# Patient Record
Sex: FEMALE | Race: WHITE | NOT HISPANIC OR LATINO | URBAN - METROPOLITAN AREA
[De-identification: names, ages, dates, MRNs, and addresses within clinical notes are randomized per-mention and may not be internally consistent; named-entity substitution may affect disease eponyms.]

---

## 2018-12-20 ENCOUNTER — INPATIENT (INPATIENT)
Facility: HOSPITAL | Age: 83
LOS: 2 days | Discharge: NURSING, REHAB OR LONG-TERM CARE | End: 2018-12-23
Attending: INTERNAL MEDICINE | Admitting: INTERNAL MEDICINE
Payer: MEDICARE

## 2018-12-20 VITALS
OXYGEN SATURATION: 96 % | HEART RATE: 93 BPM | DIASTOLIC BLOOD PRESSURE: 78 MMHG | HEIGHT: 61 IN | RESPIRATION RATE: 20 BRPM | WEIGHT: 125 LBS | SYSTOLIC BLOOD PRESSURE: 158 MMHG

## 2018-12-20 DIAGNOSIS — S32.599A OTHER SPECIFIED FRACTURE OF UNSPECIFIED PUBIS, INITIAL ENCOUNTER FOR CLOSED FRACTURE: ICD-10-CM

## 2018-12-20 DIAGNOSIS — Z90.12 ACQUIRED ABSENCE OF LEFT BREAST AND NIPPLE: Chronic | ICD-10-CM

## 2018-12-20 DIAGNOSIS — I10 ESSENTIAL (PRIMARY) HYPERTENSION: ICD-10-CM

## 2018-12-20 DIAGNOSIS — C50.412 MALIGNANT NEOPLASM OF UPPER-OUTER QUADRANT OF LEFT FEMALE BREAST: ICD-10-CM

## 2018-12-20 DIAGNOSIS — Z90.49 ACQUIRED ABSENCE OF OTHER SPECIFIED PARTS OF DIGESTIVE TRACT: Chronic | ICD-10-CM

## 2018-12-20 LAB
ALBUMIN SERPL ELPH-MCNC: 3.2 G/DL — LOW (ref 3.3–5)
ALP SERPL-CCNC: 104 U/L — SIGNIFICANT CHANGE UP (ref 40–120)
ALT FLD-CCNC: 8 U/L — LOW (ref 12–78)
ANION GAP SERPL CALC-SCNC: 8 MMOL/L — SIGNIFICANT CHANGE UP (ref 5–17)
AST SERPL-CCNC: 15 U/L — SIGNIFICANT CHANGE UP (ref 15–37)
BASOPHILS # BLD AUTO: 0.02 K/UL — SIGNIFICANT CHANGE UP (ref 0–0.2)
BASOPHILS NFR BLD AUTO: 0.4 % — SIGNIFICANT CHANGE UP (ref 0–2)
BILIRUB SERPL-MCNC: 0.9 MG/DL — SIGNIFICANT CHANGE UP (ref 0.2–1.2)
BUN SERPL-MCNC: 46 MG/DL — HIGH (ref 7–23)
CALCIUM SERPL-MCNC: 8.4 MG/DL — LOW (ref 8.5–10.1)
CHLORIDE SERPL-SCNC: 111 MMOL/L — HIGH (ref 96–108)
CO2 SERPL-SCNC: 26 MMOL/L — SIGNIFICANT CHANGE UP (ref 22–31)
CREAT SERPL-MCNC: 1.59 MG/DL — HIGH (ref 0.5–1.3)
EOSINOPHIL # BLD AUTO: 0.18 K/UL — SIGNIFICANT CHANGE UP (ref 0–0.5)
EOSINOPHIL NFR BLD AUTO: 3.8 % — SIGNIFICANT CHANGE UP (ref 0–6)
GLUCOSE SERPL-MCNC: 103 MG/DL — HIGH (ref 70–99)
HCT VFR BLD CALC: 32.3 % — LOW (ref 34.5–45)
HGB BLD-MCNC: 10.3 G/DL — LOW (ref 11.5–15.5)
IMM GRANULOCYTES NFR BLD AUTO: 0.2 % — SIGNIFICANT CHANGE UP (ref 0–1.5)
LYMPHOCYTES # BLD AUTO: 1.24 K/UL — SIGNIFICANT CHANGE UP (ref 1–3.3)
LYMPHOCYTES # BLD AUTO: 26.4 % — SIGNIFICANT CHANGE UP (ref 13–44)
MCHC RBC-ENTMCNC: 30.8 PG — SIGNIFICANT CHANGE UP (ref 27–34)
MCHC RBC-ENTMCNC: 31.9 GM/DL — LOW (ref 32–36)
MCV RBC AUTO: 96.7 FL — SIGNIFICANT CHANGE UP (ref 80–100)
MONOCYTES # BLD AUTO: 0.53 K/UL — SIGNIFICANT CHANGE UP (ref 0–0.9)
MONOCYTES NFR BLD AUTO: 11.3 % — SIGNIFICANT CHANGE UP (ref 2–14)
NEUTROPHILS # BLD AUTO: 2.72 K/UL — SIGNIFICANT CHANGE UP (ref 1.8–7.4)
NEUTROPHILS NFR BLD AUTO: 57.9 % — SIGNIFICANT CHANGE UP (ref 43–77)
PLATELET # BLD AUTO: 136 K/UL — LOW (ref 150–400)
POTASSIUM SERPL-MCNC: 4 MMOL/L — SIGNIFICANT CHANGE UP (ref 3.5–5.3)
POTASSIUM SERPL-SCNC: 4 MMOL/L — SIGNIFICANT CHANGE UP (ref 3.5–5.3)
PROT SERPL-MCNC: 6.8 GM/DL — SIGNIFICANT CHANGE UP (ref 6–8.3)
RBC # BLD: 3.34 M/UL — LOW (ref 3.8–5.2)
RBC # FLD: 13.2 % — SIGNIFICANT CHANGE UP (ref 10.3–14.5)
SODIUM SERPL-SCNC: 145 MMOL/L — SIGNIFICANT CHANGE UP (ref 135–145)
WBC # BLD: 4.7 K/UL — SIGNIFICANT CHANGE UP (ref 3.8–10.5)
WBC # FLD AUTO: 4.7 K/UL — SIGNIFICANT CHANGE UP (ref 3.8–10.5)

## 2018-12-20 PROCEDURE — 99285 EMERGENCY DEPT VISIT HI MDM: CPT

## 2018-12-20 PROCEDURE — 73502 X-RAY EXAM HIP UNI 2-3 VIEWS: CPT | Mod: 26,RT

## 2018-12-20 PROCEDURE — 72131 CT LUMBAR SPINE W/O DYE: CPT | Mod: 26

## 2018-12-20 PROCEDURE — 73721 MRI JNT OF LWR EXTRE W/O DYE: CPT | Mod: 26,RT

## 2018-12-20 PROCEDURE — 99223 1ST HOSP IP/OBS HIGH 75: CPT | Mod: AI

## 2018-12-20 PROCEDURE — 73552 X-RAY EXAM OF FEMUR 2/>: CPT | Mod: 26,RT

## 2018-12-20 PROCEDURE — 72192 CT PELVIS W/O DYE: CPT | Mod: 26

## 2018-12-20 RX ORDER — OXYBUTYNIN CHLORIDE 5 MG
10 TABLET ORAL DAILY
Qty: 0 | Refills: 0 | Status: DISCONTINUED | OUTPATIENT
Start: 2018-12-20 | End: 2018-12-23

## 2018-12-20 RX ORDER — MEMANTINE HYDROCHLORIDE 10 MG/1
10 TABLET ORAL EVERY 12 HOURS
Qty: 0 | Refills: 0 | Status: DISCONTINUED | OUTPATIENT
Start: 2018-12-20 | End: 2018-12-23

## 2018-12-20 RX ORDER — LISINOPRIL 2.5 MG/1
20 TABLET ORAL DAILY
Qty: 0 | Refills: 0 | Status: DISCONTINUED | OUTPATIENT
Start: 2018-12-20 | End: 2018-12-23

## 2018-12-20 RX ORDER — ACETAMINOPHEN 500 MG
650 TABLET ORAL EVERY 6 HOURS
Qty: 0 | Refills: 0 | Status: DISCONTINUED | OUTPATIENT
Start: 2018-12-20 | End: 2018-12-23

## 2018-12-20 RX ORDER — MIDAZOLAM HYDROCHLORIDE 1 MG/ML
1 INJECTION, SOLUTION INTRAMUSCULAR; INTRAVENOUS ONCE
Qty: 0 | Refills: 0 | Status: DISCONTINUED | OUTPATIENT
Start: 2018-12-20 | End: 2018-12-20

## 2018-12-20 RX ORDER — ERGOCALCIFEROL 1.25 MG/1
50000 CAPSULE ORAL
Qty: 0 | Refills: 0 | Status: DISCONTINUED | OUTPATIENT
Start: 2018-12-20 | End: 2018-12-23

## 2018-12-20 RX ORDER — MEMANTINE HYDROCHLORIDE 10 MG/1
0 TABLET ORAL
Qty: 0 | Refills: 0 | COMMUNITY

## 2018-12-20 RX ORDER — POLYETHYLENE GLYCOL 3350 17 G/17G
17 POWDER, FOR SOLUTION ORAL DAILY
Qty: 0 | Refills: 0 | Status: DISCONTINUED | OUTPATIENT
Start: 2018-12-20 | End: 2018-12-23

## 2018-12-20 RX ORDER — LIDOCAINE 4 G/100G
1 CREAM TOPICAL DAILY
Qty: 0 | Refills: 0 | Status: DISCONTINUED | OUTPATIENT
Start: 2018-12-20 | End: 2018-12-23

## 2018-12-20 RX ORDER — PREGABALIN 225 MG/1
1000 CAPSULE ORAL DAILY
Qty: 0 | Refills: 0 | Status: DISCONTINUED | OUTPATIENT
Start: 2018-12-20 | End: 2018-12-23

## 2018-12-20 RX ORDER — IBUPROFEN 200 MG
600 TABLET ORAL ONCE
Qty: 0 | Refills: 0 | Status: COMPLETED | OUTPATIENT
Start: 2018-12-20 | End: 2018-12-20

## 2018-12-20 RX ORDER — DOCUSATE SODIUM 100 MG
100 CAPSULE ORAL
Qty: 0 | Refills: 0 | Status: DISCONTINUED | OUTPATIENT
Start: 2018-12-20 | End: 2018-12-23

## 2018-12-20 RX ORDER — HEPARIN SODIUM 5000 [USP'U]/ML
5000 INJECTION INTRAVENOUS; SUBCUTANEOUS EVERY 12 HOURS
Qty: 0 | Refills: 0 | Status: DISCONTINUED | OUTPATIENT
Start: 2018-12-20 | End: 2018-12-23

## 2018-12-20 RX ORDER — ASPIRIN/CALCIUM CARB/MAGNESIUM 324 MG
81 TABLET ORAL DAILY
Qty: 0 | Refills: 0 | Status: DISCONTINUED | OUTPATIENT
Start: 2018-12-20 | End: 2018-12-23

## 2018-12-20 RX ORDER — OXYBUTYNIN CHLORIDE 5 MG
1 TABLET ORAL
Qty: 0 | Refills: 0 | COMMUNITY

## 2018-12-20 RX ORDER — HYDROCHLOROTHIAZIDE 25 MG
12.5 TABLET ORAL DAILY
Qty: 0 | Refills: 0 | Status: DISCONTINUED | OUTPATIENT
Start: 2018-12-20 | End: 2018-12-23

## 2018-12-20 RX ORDER — ACETAMINOPHEN 500 MG
650 TABLET ORAL ONCE
Qty: 0 | Refills: 0 | Status: COMPLETED | OUTPATIENT
Start: 2018-12-20 | End: 2018-12-20

## 2018-12-20 RX ORDER — AMLODIPINE BESYLATE 2.5 MG/1
5 TABLET ORAL DAILY
Qty: 0 | Refills: 0 | Status: DISCONTINUED | OUTPATIENT
Start: 2018-12-20 | End: 2018-12-23

## 2018-12-20 RX ORDER — HYDROXYZINE HCL 10 MG
25 TABLET ORAL AT BEDTIME
Qty: 0 | Refills: 0 | Status: DISCONTINUED | OUTPATIENT
Start: 2018-12-20 | End: 2018-12-23

## 2018-12-20 RX ORDER — RISPERIDONE 4 MG/1
0.5 TABLET ORAL DAILY
Qty: 0 | Refills: 0 | Status: DISCONTINUED | OUTPATIENT
Start: 2018-12-20 | End: 2018-12-23

## 2018-12-20 RX ADMIN — Medication 650 MILLIGRAM(S): at 10:18

## 2018-12-20 RX ADMIN — Medication 600 MILLIGRAM(S): at 10:45

## 2018-12-20 RX ADMIN — Medication 100 MILLIGRAM(S): at 18:14

## 2018-12-20 RX ADMIN — HEPARIN SODIUM 5000 UNIT(S): 5000 INJECTION INTRAVENOUS; SUBCUTANEOUS at 18:14

## 2018-12-20 RX ADMIN — Medication 25 MILLIGRAM(S): at 21:58

## 2018-12-20 RX ADMIN — Medication 600 MILLIGRAM(S): at 10:16

## 2018-12-20 RX ADMIN — Medication 1 MILLIGRAM(S): at 15:24

## 2018-12-20 RX ADMIN — Medication 650 MILLIGRAM(S): at 10:45

## 2018-12-20 RX ADMIN — MEMANTINE HYDROCHLORIDE 10 MILLIGRAM(S): 10 TABLET ORAL at 18:14

## 2018-12-20 RX ADMIN — AMLODIPINE BESYLATE 5 MILLIGRAM(S): 2.5 TABLET ORAL at 18:14

## 2018-12-20 NOTE — H&P ADULT - PROBLEM SELECTOR PLAN 1
Orthopedic consult :already seen per consult note -non-surgical fracture will treat with PT, rehab, pain management  Physical therapy consult  MRI   Pain Management

## 2018-12-20 NOTE — H&P ADULT - ASSESSMENT
89 y/o female past medical history dementia, HTN,  and left  breast cancer presents with  right hip/ pelvic pain status post fall 6 days ago, Ct scan showed concerning for nondisplaced fractures along the right superior pubic ramus at the puboacetabular junction, along the posterior/midportion of the right inferior pubic ramus, and along the right hemisacrum. Nonspecific moderate right hip joint effusion. Orthopedic consult as noted,  non-surgical fracture, will treat with PT, rehab, and pain management.     IMPROVE VTE Individual Risk Assessment          RISK                                                          Points    [  ] Previous VTE                                                3    [  ] Thrombophilia                                             2    [  ] Lower limb paralysis                                   2        (unable to hold up >15 seconds)      [  ] Current Cancer                                             2         (within 6 months)    [ x ] Immobilization > 24 hrs                              1    [  ] ICU/CCU stay > 24 hours                            1    [ x ] Age > 60                                                        1    IMPROVE VTE Score ____2_____

## 2018-12-20 NOTE — PHYSICAL THERAPY INITIAL EVALUATION ADULT - ADDITIONAL COMMENTS
Pt has private hire [facility] from 7am-3pm M-F to assist pt at day center. As perf daughter (Tho [phone]), pt was independent at home in mobility and ADL's. Pt uses a straight cane in community for ambulation. Pt has 3 steps c BL railings (too far to hold onto at the same time).

## 2018-12-20 NOTE — CONSULT NOTE ADULT - ASSESSMENT
A/P: 89 y F s/p MF w/ R LC1 Fracture       Clinically, there is a low suspicion for R hip fracture, patient does not examine as so. Difficulty ambulating probable due to patient LC1 (sup/inf pubic rami and sacral ala fracture).     Analgesia  DVT ppx, per primary team   WBAT b/l LE with assistive devices as needed  FU imaging   Recommend Lidoderm patch to area of most pain  Ice pack  PT/OT  Patient may benefit from Rehab placement  Attending aware who agrees with plan  No further orthopedic surgical interventions at this time  Will advise if plan changes  Attending aware who agrees with plan A/P: 89 y F s/p MF w/ R LC1 Fracture       Clinically, there is a low suspicion for R hip fracture, patient does not examine as so. Difficulty ambulating probable due to patient LC1 (sup/inf pubic rami and sacral ala fracture).     Analgesia  DVT ppx, per primary team   WBAT b/l LE with assistive devices as needed  FU imaging   Recommend Lidoderm patch to area of most pain  Ice pack  PT/OT  Patient may benefit from Rehab placement  No further orthopedic surgical interventions at this time  Ortho Stable  Will advise if plan changes  Attending aware who agrees with plan A/P: 89 y F s/p MF w/ R LC1 Fracture       Clinically, there is a low suspicion for R hip fracture, patient does not examine as so. Patient experiencing pain and difficulty ambulating most likely due to Right LC1 fracture. (sup/inf pubic rami and sacral ala fracture). Further Imaging is negative for R hip Fracture.     Analgesia  DVT ppx, per primary team   WBAT b/l LE with assistive devices as needed  FU imaging, MR Negative for R Hip Fracture  Recommend Lidoderm patch to area of most pain  Ice pack  PT/OT  Patient may benefit from Rehab placement  Patient is to Follow up with own orthopedist as outpatient  No further orthopedic surgical interventions at this time  Ortho Stable  Attending aware who agrees with plan

## 2018-12-20 NOTE — H&P ADULT - PMH
Hypertension, unspecified type    Malignant neoplasm of upper-outer quadrant of left female breast, unspecified estrogen receptor status  1980, s/p mastectomy no chemo or radiation, tamoxifen

## 2018-12-20 NOTE — PHYSICAL THERAPY INITIAL EVALUATION ADULT - CRITERIA FOR SKILLED THERAPEUTIC INTERVENTIONS
therapy frequency/anticipated equipment needs at discharge/impairments found/risk reduction/prevention/rehab potential/anticipated discharge recommendation/predicted duration of therapy intervention/subacute rehab/functional limitations in following categories

## 2018-12-20 NOTE — PHYSICAL THERAPY INITIAL EVALUATION ADULT - SPECIFY REASON(S)
Chart reviewed. Pt is s/p fall c R sup pubic rami fx, RLE non WB, awaiting MRI results. Will continue follow up.

## 2018-12-20 NOTE — PHYSICAL THERAPY INITIAL EVALUATION ADULT - GAIT DEVIATIONS NOTED, PT EVAL
decreased stride length/decreased weight-shifting ability/decreased step length/decreased stacie/increased time in double stance/decreased velocity of limb motion

## 2018-12-20 NOTE — ED PROVIDER NOTE - OBJECTIVE STATEMENT
89 y/o female hx dementia, htn present with right hip/lower back pain s/p mechanical fall onto right side 6 days ago. Per daughters, pt did not hit head, no other injuries but has been having pain to right hip region limiting ambulation. Tries to ambulate with cane but cries out in pain. Had neg xray per daughters at [facility], but saw Dr. Ochoa [facility] for [facility]- pt too agitated for mri so was told to come to ED for possible need for rehab. Pt poor historian but denies other complaints. Otherwise behaving her usual self without other abnormalities.     limited ros by dementia

## 2018-12-20 NOTE — ED PROVIDER NOTE - PROGRESS NOTE DETAILS
Obey: spoke to ortho, seeing pt, would like mri (which pt has had difficulty with given agitation/dementia/claustrophobia). will try some ativan, admited to Dr. Melara, PT knows pt non weight bearing on right. admitted for ortho/rehab.

## 2018-12-20 NOTE — H&P ADULT - NSHPLABSRESULTS_GEN_ALL_CORE
10.3   4.70  )-----------( 136      ( 20 Dec 2018 10:41 )             32.3     12-20    145  |  111<H>  |  46<H>  ----------------------------<  103<H>  4.0   |  26  |  1.59<H>    Ca    8.4<L>      20 Dec 2018 10:41    TPro  6.8  /  Alb  3.2<L>  /  TBili  0.9  /  DBili  x   /  AST  15  /  ALT  8<L>  /  AlkPhos  104  12-20    < from: Xray Pelvis 3 Views (12.20.18 @ 13:56) >      IMPRESSION: No visible fracture on standard filming. There is a left   lower lumbar curve. CAT scan showed pelvic fractures. Please see that   report.    < from: CT Pelvis Bony Only No Cont (12.20.18 @ 11:37) >    Impression:    Findings concerning for nondisplaced fractures along the right superior   pubic ramus at the puboacetabular junction, along the   posterior/midportion of the right inferior pubic ramus, and along the   right hemisacrum.    Nonspecific moderate right hip joint effusion. No definite fractures   demonstrated, however if there are no clinical contraindications further   evaluation with MRI is recommended to further evaluate.      < from: CT Head w/Cont (03.22.14 @ 19:35) >      IMPRESSION: No acute intracranial pathology. No significant change from   11/09/2013.    < from: MRI Head w/o Cont (11.11.13 @ 15:34) >    IMPRESSION: 1) scattered chronic ischemic changes noted within both   hemispheres, in conjunction with volume loss. Left mastoid partial   opacification. No diffusion restriction or acute ischemia is noted.    < from: Xray Chest 1 View AP/PA. (03.22.14 @ 19:32) >    IMPRESSION: Old deformity left humerus. Question right thyroid   enlargement. Otherwise unremarkable chest.

## 2018-12-20 NOTE — H&P ADULT - HISTORY OF PRESENT ILLNESS
89 y/o female past medical history dementia, HTN,  and left  breast cancer presents with  right hip/ pelvic pain status post fall 6 days ago,  Patient is a poor historian;  Daughter states she  brought her mom to the urgent care , where she was treated for a contusion then released. She was still complaining of pain the following day  and she reached out to her pcp Dr. Londono who referred her to [facility]. She was seen , MRI  was ordered as outpatient, however unable to obtain due to patient's combativeness. Her daughter states she gave her advil for her pain with mild relief.  Patient had increasing pain and daughter called EMS. Patient denies any fever, chills, n/v/d. Denies urinary or bladder dysfunction, her norm is urinary incontinence.

## 2018-12-20 NOTE — ED PROVIDER NOTE - MEDICAL DECISION MAKING DETAILS
hip pain from fall 6 days ago, with outside neg fractures, unable to get mri without sedation. will get ct scan of hip/pelvis to r/o fracture though clinically can range. PT consult, likely rehab.

## 2018-12-20 NOTE — ED PROVIDER NOTE - PHYSICAL EXAMINATION
Gen: No acute distress, non toxic  HEENT: Mucous membranes moist, pink conjunctivae, EOMI  CV: RRR, nl s1/s2.  Resp: CTAB, normal rate and effort  GI: Abdomen soft, NT, ND. No rebound, no guarding  : No CVAT  Neuro: A&O x 3, moving all 4 extremities  MSK: tenderness lateral right hip, can range, no shortening/rotation. neurovascularly intact. no significant mid tenderness in back.  Skin: No rashes. intact and perfused.

## 2018-12-20 NOTE — H&P ADULT - NSHPPHYSICALEXAM_GEN_ALL_CORE
GENERAL: NAD thin  HEAD:  Atraumatic, Normocephalic  EYES: EOMI, PERRLA, conjunctiva and sclera clear  ENMT: No tonsillar erythema, exudates, or enlargement; Moist mucous membranes, Good dentition, No lesions  NECK: Supple, No JVD, Normal thyroid  NERVOUS SYSTEM:  Alert and orientated  x 1 ; Motor Strength 3/5 B/L upper and lower extremities; DTRs 2+ intact and symmetric  CHEST/LUNG: Clear to percussion bilaterally; No rales, rhonchi, wheezing, or rubs  HEART: Regular rate and rhythm; No murmurs, rubs, or gallops  ABDOMEN: Soft, Nontender, Nondistended; Bowel sounds present  EXTREMITIES:  2+ Peripheral Pulses, No clubbing, cyanosis, or edema decreased mobility  LYMPH: No lymphadenopathy   SKIN: No rashes or lesions ICU Vital Signs Last 24 Hrs  T(C): 36.9 (20 Dec 2018 15:06), Max: 36.9 (20 Dec 2018 15:06)  T(F): 98.4 (20 Dec 2018 15:06), Max: 98.4 (20 Dec 2018 15:06)  HR: 80 (20 Dec 2018 15:06) (80 - 93)  BP: 124/76 (20 Dec 2018 15:06) (124/76 - 158/78)  BP(mean): --  ABP: --  ABP(mean): --  RR: 18 (20 Dec 2018 15:06) (18 - 20)  SpO2: 96% (20 Dec 2018 15:06) (96% - 96%)  GENERAL: NAD thin  HEAD:  Atraumatic, Normocephalic  EYES: EOMI, PERRLA, conjunctiva and sclera clear  ENMT: No tonsillar erythema, exudates, or enlargement; Moist mucous membranes, Good dentition, No lesions  NECK: Supple, No JVD, Normal thyroid  NERVOUS SYSTEM:  Alert and orientated  x 1 ; Motor Strength 3/5 B/L upper and lower extremities; DTRs 2+ intact and symmetric  CHEST/LUNG: Clear to percussion bilaterally; No rales, rhonchi, wheezing, or rubs  HEART: Regular rate and rhythm; No murmurs, rubs, or gallops  ABDOMEN: Soft, Nontender, Nondistended; Bowel sounds present  EXTREMITIES:  2+ Peripheral Pulses, No clubbing, cyanosis, or edema decreased mobility  LYMPH: No lymphadenopathy   SKIN: No rashes or lesions

## 2018-12-20 NOTE — ED ADULT NURSE NOTE - OBJECTIVE STATEMENT
Pt with right hip pain and unable to walk after a fall 6 days ago getting out of car, hx dementia. Pt was not able to do MRI.

## 2018-12-20 NOTE — CONSULT NOTE ADULT - SUBJECTIVE AND OBJECTIVE BOX
89 y F home ambulator without assistive devices presents to the ED with right hip pain. Patient lives with daughter, and experienced a MF at home, onto her right backside, 6 days ago. Pt has a hx of severe dementia, poor historian. Per daughter at bedside patient did not HH or LOC. Pt was able to ambulate after the incident, however was having difficulty and pain. Pt was seen by Dr. Glover from [facility], imaging done at the office was negative for any fractures, however he recommended outpatient MRI to rule out any pelvic fractures. When patient went for the MRI test, she became very agitated and was unable to perform the test. Daughter states the patient was brought in due to her increased pain and difficulty ambulating.   Pt is a poor historian, however denies any sob, fever, chill, nausea, vomiting. No other complaints.         PAST MEDICAL & SURGICAL HISTORY:  HTN  OA    Home Medications:  amLODIPine 5 mg oral tablet: 1 tab(s) orally once a day (20 Dec 2018 10:27)  aspirin 81 mg oral delayed release tablet: 1 tab(s) orally once a day (20 Dec 2018 10:27)  hydrALAZINE 25 mg oral tablet: 1 tab(s) orally every 8 hours (20 Dec 2018 10:27)  hydroCHLOROthiazide 12.5 mg oral tablet: 1 tab(s) orally once a day (20 Dec 2018 10:27)  lisinopril 20 mg oral tablet: 1 tab(s) orally once a day (20 Dec 2018 10:27)  memantine 10 mg oral tablet: 1 tab(s) orally every 12 hours (20 Dec 2018 10:27)  nitrofurantoin macrocrystals-monohydrate 100 mg oral capsule: 1 cap(s) orally 2 times a day (with meals) (20 Dec 2018 10:27)      Allergies    codeine (Other)    Intolerances        Imaging:    CT Pelvis: Superior Non displaced Right sided pubic rami fracture at the acetabular junction, with possible right sided inferior pubic rami fracture and right sided sacra insufficiency fracture.   R hip joint effusion, probable due to pubic rami fracture                PE:    GEN: NAD, resting comfortably in stretcher, Demented   Neuro: A&O x 1    Pelvis:  Skin intact  TTP over the Right Pubis  No gross deformity  No gross instability with ASIS compression      RLE:  No gross deformity  Skin intact, no erythema or echymosis  Non TTP over the G. Trochanter  TTP over the medial knee joint line, hx of arthritis  No Knee joint effusion   able to SLR  able to hold leg up against gravity with minimal pain  no pain with axial load onto the hip joint  negative heel strike  no pain with log roll  SILT L3-S1  EHL/FHL/GSC/TA intact  DP pulse 2+  compartments soft and compressible  no calf tenderness      Secondary Survey: Neck Supple, non TTP C/T/L spine midline, no palpable bony step offs, No TTP over bony prominences, SILT, palpable pulses, full/painless range of motion, compartments soft, able to SLR LLE.

## 2018-12-20 NOTE — PHYSICAL THERAPY INITIAL EVALUATION ADULT - NS ASR RISK AREAS PT EVAL
1038 pt to cath lab w/ cath lab nurses transporting.  Pt bp up and sweating at time of transfer.  No c/o pain.  2 l o2 nc placed due to slight soa.     safety awareness/impaired judgment/fall

## 2018-12-20 NOTE — ED ADULT TRIAGE NOTE - CHIEF COMPLAINT QUOTE
ems states, " pt fell 6 days ago getting out of car, c/o right hip pain , seeing md lim " hx dementia possible sedation for mri

## 2018-12-21 ENCOUNTER — TRANSCRIPTION ENCOUNTER (OUTPATIENT)
Age: 83
End: 2018-12-21

## 2018-12-21 DIAGNOSIS — N17.9 ACUTE KIDNEY FAILURE, UNSPECIFIED: ICD-10-CM

## 2018-12-21 LAB
ANION GAP SERPL CALC-SCNC: 11 MMOL/L — SIGNIFICANT CHANGE UP (ref 5–17)
BUN SERPL-MCNC: 34 MG/DL — HIGH (ref 7–23)
CALCIUM SERPL-MCNC: 8.9 MG/DL — SIGNIFICANT CHANGE UP (ref 8.5–10.1)
CHLORIDE SERPL-SCNC: 110 MMOL/L — HIGH (ref 96–108)
CO2 SERPL-SCNC: 24 MMOL/L — SIGNIFICANT CHANGE UP (ref 22–31)
CREAT SERPL-MCNC: 1.24 MG/DL — SIGNIFICANT CHANGE UP (ref 0.5–1.3)
GLUCOSE SERPL-MCNC: 82 MG/DL — SIGNIFICANT CHANGE UP (ref 70–99)
HCT VFR BLD CALC: 32.8 % — LOW (ref 34.5–45)
HGB BLD-MCNC: 10.5 G/DL — LOW (ref 11.5–15.5)
MCHC RBC-ENTMCNC: 30.5 PG — SIGNIFICANT CHANGE UP (ref 27–34)
MCHC RBC-ENTMCNC: 32 GM/DL — SIGNIFICANT CHANGE UP (ref 32–36)
MCV RBC AUTO: 95.3 FL — SIGNIFICANT CHANGE UP (ref 80–100)
NRBC # BLD: 0 /100 WBCS — SIGNIFICANT CHANGE UP (ref 0–0)
PLATELET # BLD AUTO: 136 K/UL — LOW (ref 150–400)
POTASSIUM SERPL-MCNC: 4.1 MMOL/L — SIGNIFICANT CHANGE UP (ref 3.5–5.3)
POTASSIUM SERPL-SCNC: 4.1 MMOL/L — SIGNIFICANT CHANGE UP (ref 3.5–5.3)
RBC # BLD: 3.44 M/UL — LOW (ref 3.8–5.2)
RBC # FLD: 13.2 % — SIGNIFICANT CHANGE UP (ref 10.3–14.5)
SODIUM SERPL-SCNC: 145 MMOL/L — SIGNIFICANT CHANGE UP (ref 135–145)
WBC # BLD: 5.39 K/UL — SIGNIFICANT CHANGE UP (ref 3.8–10.5)
WBC # FLD AUTO: 5.39 K/UL — SIGNIFICANT CHANGE UP (ref 3.8–10.5)

## 2018-12-21 PROCEDURE — 99233 SBSQ HOSP IP/OBS HIGH 50: CPT

## 2018-12-21 RX ADMIN — Medication 25 MILLIGRAM(S): at 21:25

## 2018-12-21 RX ADMIN — MEMANTINE HYDROCHLORIDE 10 MILLIGRAM(S): 10 TABLET ORAL at 06:13

## 2018-12-21 RX ADMIN — HEPARIN SODIUM 5000 UNIT(S): 5000 INJECTION INTRAVENOUS; SUBCUTANEOUS at 06:16

## 2018-12-21 RX ADMIN — MEMANTINE HYDROCHLORIDE 10 MILLIGRAM(S): 10 TABLET ORAL at 17:37

## 2018-12-21 RX ADMIN — RISPERIDONE 0.5 MILLIGRAM(S): 4 TABLET ORAL at 12:15

## 2018-12-21 RX ADMIN — LIDOCAINE 1 PATCH: 4 CREAM TOPICAL at 12:14

## 2018-12-21 RX ADMIN — Medication 12.5 MILLIGRAM(S): at 06:14

## 2018-12-21 RX ADMIN — HEPARIN SODIUM 5000 UNIT(S): 5000 INJECTION INTRAVENOUS; SUBCUTANEOUS at 17:37

## 2018-12-21 RX ADMIN — ERGOCALCIFEROL 50000 UNIT(S): 1.25 CAPSULE ORAL at 12:15

## 2018-12-21 RX ADMIN — LISINOPRIL 20 MILLIGRAM(S): 2.5 TABLET ORAL at 06:13

## 2018-12-21 RX ADMIN — Medication 100 MILLIGRAM(S): at 17:37

## 2018-12-21 RX ADMIN — PREGABALIN 1000 MICROGRAM(S): 225 CAPSULE ORAL at 12:15

## 2018-12-21 RX ADMIN — LIDOCAINE 1 PATCH: 4 CREAM TOPICAL at 19:00

## 2018-12-21 RX ADMIN — AMLODIPINE BESYLATE 5 MILLIGRAM(S): 2.5 TABLET ORAL at 06:13

## 2018-12-21 RX ADMIN — Medication 81 MILLIGRAM(S): at 12:15

## 2018-12-21 RX ADMIN — Medication 10 MILLIGRAM(S): at 12:15

## 2018-12-21 RX ADMIN — Medication 100 MILLIGRAM(S): at 06:14

## 2018-12-21 NOTE — DISCHARGE NOTE ADULT - PROVIDER TOKENS
FREE:[LAST:[Dr. Londono],PHONE:[(   )    -],FAX:[(   )    -],ADDRESS:[PMD]],TOKEN:'[id number]'

## 2018-12-21 NOTE — DISCHARGE NOTE ADULT - CARE PROVIDER_API CALL
Dr. Londono,   PMD  Phone: (   )    -  Fax: (   )    -    Eleazar Stewart), Orthopaedic Surgery [facility]  [address] [place] [address]  Phone: [phone]  Fax: [phone]

## 2018-12-21 NOTE — PROGRESS NOTE ADULT - ASSESSMENT
89 y/o female past medical history dementia, HTN,  and left  breast cancer presents with  right hip/ pelvic pain status post fall 6 days ago, Ct scan showed concerning for nondisplaced fractures along the right superior pubic ramus at the puboacetabular junction, along the posterior/midportion of the right inferior pubic ramus, and along the right hemisacrum. Nonspecific moderate right hip joint effusion. Orthopedic consult as noted,  non-surgical fracture, will treat with PT, rehab, and pain management.

## 2018-12-21 NOTE — DISCHARGE NOTE ADULT - HOSPITAL COURSE
89 y/o female past medical history dementia, HTN,  and left  breast cancer presents with  right hip/ pelvic pain status post fall 6 days ago, Ct scan showed concerning for nondisplaced fractures along the right superior pubic ramus at the puboacetabular junction, along the posterior/midportion of the right inferior pubic ramus, and along the right hemisacrum. Nonspecific moderate right hip joint effusion. Orthopedic consult as noted,  non-surgical fracture, will treat with PT, rehab, and pain management.     Transfer to [facility] for Katerina. f/u with ortho as outpt    45min spent on dc planning

## 2018-12-21 NOTE — PROGRESS NOTE ADULT - SUBJECTIVE AND OBJECTIVE BOX
----- Message from Rajni Howard MD sent at 9/5/2017 10:32 AM EDT -----  Please make sure patient has order for labs at infusion next week (CBC, CMP, ESR, CRP). Does not need to wait for results before infusion if otherwise doing well. Patient is a 89y old  Female who presents with a chief complaint of hip/pelvic pain (21 Dec 2018 11:34)      INTERVAL HPI/OVERNIGHT EVENTS: no events     MEDICATIONS  (STANDING):  amLODIPine   Tablet 5 milliGRAM(s) Oral daily  aspirin enteric coated 81 milliGRAM(s) Oral daily  cyanocobalamin 1000 MICROGram(s) Oral daily  docusate sodium 100 milliGRAM(s) Oral two times a day  ergocalciferol 78256 Unit(s) Oral every week  heparin  Injectable 5000 Unit(s) SubCutaneous every 12 hours  hydrochlorothiazide 12.5 milliGRAM(s) Oral daily  hydrOXYzine hydrochloride 25 milliGRAM(s) Oral at bedtime  lidocaine   Patch 1 Patch Transdermal daily  lisinopril 20 milliGRAM(s) Oral daily  memantine 10 milliGRAM(s) Oral every 12 hours  oxybutynin 10 milliGRAM(s) Oral daily  risperiDONE   Tablet 0.5 milliGRAM(s) Oral daily    MEDICATIONS  (PRN):  acetaminophen   Tablet .. 650 milliGRAM(s) Oral every 6 hours PRN Mild Pain (1 - 3)  polyethylene glycol 3350 17 Gram(s) Oral daily PRN Constipation      Allergies    codeine (Other)    Intolerances         Vital Signs Last 24 Hrs  T(C): 37.2 (21 Dec 2018 11:54), Max: 37.2 (21 Dec 2018 11:54)  T(F): 98.9 (21 Dec 2018 11:54), Max: 98.9 (21 Dec 2018 11:54)  HR: 101 (21 Dec 2018 12:25) (80 - 107)  BP: 138/79 (21 Dec 2018 12:25) (117/71 - 139/88)  BP(mean): --  RR: 17 (21 Dec 2018 12:25) (17 - 18)  SpO2: 96% (21 Dec 2018 12:25) (95% - 98%)    PHYSICAL EXAM:  GENERAL: NAD, well-groomed, well-developed  HEAD:  Atraumatic, Normocephalic  EYES: EOMI, PERRLA, conjunctiva and sclera clear  ENMT: No tonsillar erythema, exudates, or enlargement; Moist mucous membranes, Good dentition, No lesions  NECK: Supple, No JVD, Normal thyroid  NERVOUS SYSTEM:  Alert & Oriented X1 Good concentration; Motor Strength 5/5 B/L upper and lower extremities; DTRs 2+ intact and symmetric  CHEST/LUNG: Clear to percussion bilaterally; No rales, rhonchi, wheezing, or rubs  HEART: Regular rate and rhythm; No murmurs, rubs, or gallops  ABDOMEN: Soft, Nontender, Nondistended; Bowel sounds present  EXTREMITIES:  2+ Peripheral Pulses, No clubbing, cyanosis, or edema  LYMPH: No lymphadenopathy noted  SKIN: No rashes or lesions    LABS:                        10.5   5.39  )-----------( 136      ( 21 Dec 2018 08:20 )             32.8     12-21    145  |  110<H>  |  34<H>  ----------------------------<  82  4.1   |  24  |  1.24    Ca    8.9      21 Dec 2018 08:20    TPro  6.8  /  Alb  3.2<L>  /  TBili  0.9  /  DBili  x   /  AST  15  /  ALT  8<L>  /  AlkPhos  104  12-20        CAPILLARY BLOOD GLUCOSE          RADIOLOGY & ADDITIONAL TESTS:    Imaging Personally Reviewed:  [ X] YES  [ ] NO    Consultant(s) Notes Reviewed:  [ X] YES  [ ] NO    Care Discussed with Consultants/Other Providers [X ] YES  [ ] NO

## 2018-12-21 NOTE — DISCHARGE NOTE ADULT - MEDICATION SUMMARY - MEDICATIONS TO TAKE
I will START or STAY ON the medications listed below when I get home from the hospital:    aspirin 81 mg oral delayed release tablet  -- 1 tab(s) by mouth once a day  -- Indication: For Preventive    acetaminophen 325 mg oral tablet  -- 2 tab(s) by mouth every 6 hours, As needed, Mild Pain (1 - 3)  -- Indication: For Preventive    lisinopril 20 mg oral tablet  -- 1 tab(s) by mouth once a day  -- Indication: For Htn    RisperDAL 0.5 mg oral tablet  -- 1 tab(s) orally  -- Indication: For Preventive    hydrOXYzine hydrochloride 10 mg oral tablet  -- 10 milligram(s) by mouth once (at bedtime)  -- Indication: For Preventive    amLODIPine 5 mg oral tablet  -- 1 tab(s) by mouth once a day  -- Indication: For Htn    lidocaine 5% topical film  --  on skin   -- Indication: For Preventive    hydroCHLOROthiazide 12.5 mg oral tablet  -- 1 tab(s) by mouth once a day  -- Indication: For Htn    polyethylene glycol 3350 oral powder for reconstitution  -- 17 gram(s) by mouth once a day, As needed, Constipation  -- Indication: For Preventive    docusate sodium 100 mg oral capsule  -- 1 cap(s) by mouth 2 times a day  -- Indication: For Preventive    memantine 10 mg oral tablet  -- 1 tab(s) by mouth every 12 hours  -- Indication: For Preventive    oxybutynin 10 mg/24 hr oral tablet, extended release  -- 1 tab(s) by mouth once a day  -- Indication: For Preventive    Vitamin D2 400 intl units oral tablet  -- Indication: For Preventive    Vitamin B-12 500 mcg oral tablet  -- 1 tab(s) by mouth once a day  -- Indication: For Preventive

## 2018-12-21 NOTE — DISCHARGE NOTE ADULT - SECONDARY DIAGNOSIS.
Hypertension, unspecified type Malignant neoplasm of upper-outer quadrant of left female breast, unspecified estrogen receptor status

## 2018-12-21 NOTE — DISCHARGE NOTE ADULT - PLAN OF CARE
Follow up with ortho as outpt Physical therapy Continue medications as prescribed  Follow up with PMD  Low-sodium diet  AHA Recipes - [email or web address] Follow up with PMD Physical therapy  WBAT

## 2018-12-21 NOTE — DISCHARGE NOTE ADULT - PATIENT PORTAL LINK FT
You can access the [facility] Patient Portal, offered by [facility], by registering with the following website: [email or web address]

## 2018-12-22 PROCEDURE — 99232 SBSQ HOSP IP/OBS MODERATE 35: CPT

## 2018-12-22 RX ADMIN — HEPARIN SODIUM 5000 UNIT(S): 5000 INJECTION INTRAVENOUS; SUBCUTANEOUS at 17:40

## 2018-12-22 RX ADMIN — MEMANTINE HYDROCHLORIDE 10 MILLIGRAM(S): 10 TABLET ORAL at 17:40

## 2018-12-22 RX ADMIN — PREGABALIN 1000 MICROGRAM(S): 225 CAPSULE ORAL at 13:10

## 2018-12-22 RX ADMIN — LIDOCAINE 1 PATCH: 4 CREAM TOPICAL at 00:07

## 2018-12-22 RX ADMIN — Medication 12.5 MILLIGRAM(S): at 06:28

## 2018-12-22 RX ADMIN — HEPARIN SODIUM 5000 UNIT(S): 5000 INJECTION INTRAVENOUS; SUBCUTANEOUS at 06:28

## 2018-12-22 RX ADMIN — Medication 10 MILLIGRAM(S): at 11:46

## 2018-12-22 RX ADMIN — Medication 25 MILLIGRAM(S): at 22:02

## 2018-12-22 RX ADMIN — Medication 100 MILLIGRAM(S): at 06:28

## 2018-12-22 RX ADMIN — MEMANTINE HYDROCHLORIDE 10 MILLIGRAM(S): 10 TABLET ORAL at 06:28

## 2018-12-22 RX ADMIN — LIDOCAINE 1 PATCH: 4 CREAM TOPICAL at 11:46

## 2018-12-22 RX ADMIN — LISINOPRIL 20 MILLIGRAM(S): 2.5 TABLET ORAL at 06:28

## 2018-12-22 RX ADMIN — AMLODIPINE BESYLATE 5 MILLIGRAM(S): 2.5 TABLET ORAL at 06:28

## 2018-12-22 RX ADMIN — Medication 81 MILLIGRAM(S): at 11:41

## 2018-12-22 RX ADMIN — Medication 100 MILLIGRAM(S): at 17:40

## 2018-12-22 RX ADMIN — RISPERIDONE 0.5 MILLIGRAM(S): 4 TABLET ORAL at 11:46

## 2018-12-22 NOTE — PROGRESS NOTE ADULT - ASSESSMENT
89 y/o female past medical history dementia, HTN,  and left  breast cancer presents with  right hip/ pelvic pain status post fall 6 days ago, Ct scan showed concerning for nondisplaced fractures along the right superior pubic ramus at the puboacetabular junction, along the posterior/midportion of the right inferior pubic ramus, and along the right hemisacrum. Nonspecific moderate right hip joint effusion. Orthopedic consult as noted,  non-surgical fracture, will treat with PT, rehab, and pain management.

## 2018-12-22 NOTE — PROGRESS NOTE ADULT - SUBJECTIVE AND OBJECTIVE BOX
Patient is a 89y old  Female who presents with a chief complaint of hip/pelvic pain (21 Dec 2018 13:12)      INTERVAL HPI/OVERNIGHT EVENTS: no events     MEDICATIONS  (STANDING):  amLODIPine   Tablet 5 milliGRAM(s) Oral daily  aspirin enteric coated 81 milliGRAM(s) Oral daily  cyanocobalamin 1000 MICROGram(s) Oral daily  docusate sodium 100 milliGRAM(s) Oral two times a day  ergocalciferol 11252 Unit(s) Oral every week  heparin  Injectable 5000 Unit(s) SubCutaneous every 12 hours  hydrochlorothiazide 12.5 milliGRAM(s) Oral daily  hydrOXYzine hydrochloride 25 milliGRAM(s) Oral at bedtime  lidocaine   Patch 1 Patch Transdermal daily  lisinopril 20 milliGRAM(s) Oral daily  memantine 10 milliGRAM(s) Oral every 12 hours  oxybutynin 10 milliGRAM(s) Oral daily  risperiDONE   Tablet 0.5 milliGRAM(s) Oral daily    MEDICATIONS  (PRN):  acetaminophen   Tablet .. 650 milliGRAM(s) Oral every 6 hours PRN Mild Pain (1 - 3)  polyethylene glycol 3350 17 Gram(s) Oral daily PRN Constipation      Allergies    codeine (Other)    Intolerances        REVIEW OF SYSTEMS:  CONSTITUTIONAL: No fever, weight loss, or fatigue  EYES: No eye pain, visual disturbances, or discharge  ENMT:  No difficulty hearing, tinnitus, vertigo; No sinus or throat pain  NECK: No pain or stiffness  BREASTS: No pain, masses, or nipple discharge  RESPIRATORY: No cough, wheezing, chills or hemoptysis; No shortness of breath  CARDIOVASCULAR: No chest pain, palpitations, dizziness, or leg swelling  GASTROINTESTINAL: No abdominal or epigastric pain. No nausea, vomiting, or hematemesis; No diarrhea or constipation. No melena or hematochezia.  GENITOURINARY: No dysuria, frequency, hematuria, or incontinence  NEUROLOGICAL: No headaches, memory loss, loss of strength, numbness, or tremors  SKIN: No itching, burning, rashes, or lesions   LYMPH NODES: No enlarged glands  ENDOCRINE: No heat or cold intolerance; No hair loss  MUSCULOSKELETAL: No joint pain or swelling; No muscle, back, or extremity pain  PSYCHIATRIC: No depression, anxiety, mood swings, or difficulty sleeping  HEME/LYMPH: No easy bruising, or bleeding gums  ALLERGY AND IMMUNOLOGIC: No hives or eczema    Vital Signs Last 24 Hrs  T(C): 37.1 (22 Dec 2018 12:08), Max: 37.4 (21 Dec 2018 23:17)  T(F): 98.7 (22 Dec 2018 12:08), Max: 99.3 (21 Dec 2018 23:17)  HR: 74 (22 Dec 2018 13:11) (71 - 96)  BP: 159/86 (22 Dec 2018 12:08) (110/70 - 159/86)  BP(mean): --  RR: 18 (22 Dec 2018 13:11) (16 - 18)  SpO2: 99% (22 Dec 2018 13:11) (96% - 100%)    PHYSICAL EXAM:  GENERAL: NAD, well-groomed, well-developed  HEAD:  Atraumatic, Normocephalic  EYES: EOMI, PERRLA, conjunctiva and sclera clear  ENMT: No tonsillar erythema, exudates, or enlargement; Moist mucous membranes, Good dentition, No lesions  NECK: Supple, No JVD, Normal thyroid  NERVOUS SYSTEM:  Alert & Oriented X1, Good concentration; Motor Strength 5/5 B/L upper and lower extremities; DTRs 2+ intact and symmetric  CHEST/LUNG: Clear to percussion bilaterally; No rales, rhonchi, wheezing, or rubs  HEART: Regular rate and rhythm; No murmurs, rubs, or gallops  ABDOMEN: Soft, Nontender, Nondistended; Bowel sounds present  EXTREMITIES:  2+ Peripheral Pulses, No clubbing, cyanosis, or edema  LYMPH: No lymphadenopathy noted  SKIN: No rashes or lesions    LABS:                        10.5   5.39  )-----------( 136      ( 21 Dec 2018 08:20 )             32.8     12-21    145  |  110<H>  |  34<H>  ----------------------------<  82  4.1   |  24  |  1.24    Ca    8.9      21 Dec 2018 08:20          CAPILLARY BLOOD GLUCOSE          RADIOLOGY & ADDITIONAL TESTS:    Imaging Personally Reviewed:  [ X] YES  [ ] NO    Consultant(s) Notes Reviewed:  [ X] YES  [ ] NO    Care Discussed with Consultants/Other Providers [X ] YES  [ ] NO

## 2018-12-23 VITALS
OXYGEN SATURATION: 97 % | SYSTOLIC BLOOD PRESSURE: 119 MMHG | DIASTOLIC BLOOD PRESSURE: 79 MMHG | HEART RATE: 84 BPM | TEMPERATURE: 98 F | RESPIRATION RATE: 18 BRPM

## 2018-12-23 PROCEDURE — 99239 HOSP IP/OBS DSCHRG MGMT >30: CPT

## 2018-12-23 RX ORDER — ACETAMINOPHEN 500 MG
2 TABLET ORAL
Qty: 0 | Refills: 0 | DISCHARGE
Start: 2018-12-23

## 2018-12-23 RX ORDER — LIDOCAINE 4 G/100G
0 CREAM TOPICAL
Qty: 0 | Refills: 0 | DISCHARGE
Start: 2018-12-23

## 2018-12-23 RX ORDER — DOCUSATE SODIUM 100 MG
1 CAPSULE ORAL
Qty: 0 | Refills: 0 | DISCHARGE
Start: 2018-12-23

## 2018-12-23 RX ORDER — MEMANTINE HYDROCHLORIDE 10 MG/1
1 TABLET ORAL
Qty: 0 | Refills: 0 | COMMUNITY
Start: 2018-12-23

## 2018-12-23 RX ORDER — POLYETHYLENE GLYCOL 3350 17 G/17G
17 POWDER, FOR SOLUTION ORAL
Qty: 0 | Refills: 0 | DISCHARGE
Start: 2018-12-23

## 2018-12-23 RX ADMIN — Medication 100 MILLIGRAM(S): at 05:37

## 2018-12-23 RX ADMIN — HEPARIN SODIUM 5000 UNIT(S): 5000 INJECTION INTRAVENOUS; SUBCUTANEOUS at 05:37

## 2018-12-23 RX ADMIN — LIDOCAINE 1 PATCH: 4 CREAM TOPICAL at 12:00

## 2018-12-23 RX ADMIN — PREGABALIN 1000 MICROGRAM(S): 225 CAPSULE ORAL at 11:59

## 2018-12-23 RX ADMIN — RISPERIDONE 0.5 MILLIGRAM(S): 4 TABLET ORAL at 12:00

## 2018-12-23 RX ADMIN — LISINOPRIL 20 MILLIGRAM(S): 2.5 TABLET ORAL at 05:37

## 2018-12-23 RX ADMIN — AMLODIPINE BESYLATE 5 MILLIGRAM(S): 2.5 TABLET ORAL at 05:37

## 2018-12-23 RX ADMIN — Medication 12.5 MILLIGRAM(S): at 05:37

## 2018-12-23 RX ADMIN — MEMANTINE HYDROCHLORIDE 10 MILLIGRAM(S): 10 TABLET ORAL at 05:37

## 2018-12-23 RX ADMIN — Medication 10 MILLIGRAM(S): at 11:59

## 2018-12-23 RX ADMIN — Medication 81 MILLIGRAM(S): at 11:59
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
## 2018-12-27 DIAGNOSIS — Z90.12 ACQUIRED ABSENCE OF LEFT BREAST AND NIPPLE: ICD-10-CM

## 2018-12-27 DIAGNOSIS — M19.90 UNSPECIFIED OSTEOARTHRITIS, UNSPECIFIED SITE: ICD-10-CM

## 2018-12-27 DIAGNOSIS — Z88.5 ALLERGY STATUS TO NARCOTIC AGENT: ICD-10-CM

## 2018-12-27 DIAGNOSIS — S32.511A FRACTURE OF SUPERIOR RIM OF RIGHT PUBIS, INITIAL ENCOUNTER FOR CLOSED FRACTURE: ICD-10-CM

## 2018-12-27 DIAGNOSIS — Z85.3 PERSONAL HISTORY OF MALIGNANT NEOPLASM OF BREAST: ICD-10-CM

## 2018-12-27 DIAGNOSIS — Z90.49 ACQUIRED ABSENCE OF OTHER SPECIFIED PARTS OF DIGESTIVE TRACT: ICD-10-CM

## 2018-12-27 DIAGNOSIS — F03.90 UNSPECIFIED DEMENTIA WITHOUT BEHAVIORAL DISTURBANCE: ICD-10-CM
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
## 2018-12-27 DIAGNOSIS — S32.591A OTHER SPECIFIED FRACTURE OF RIGHT PUBIS, INITIAL ENCOUNTER FOR CLOSED FRACTURE: ICD-10-CM

## 2018-12-27 DIAGNOSIS — M25.451 EFFUSION, RIGHT HIP: ICD-10-CM

## 2018-12-27 DIAGNOSIS — N17.9 ACUTE KIDNEY FAILURE, UNSPECIFIED: ICD-10-CM

## 2018-12-27 DIAGNOSIS — S32.19XA OTHER FRACTURE OF SACRUM, INITIAL ENCOUNTER FOR CLOSED FRACTURE: ICD-10-CM
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
## 2018-12-27 DIAGNOSIS — I10 ESSENTIAL (PRIMARY) HYPERTENSION: ICD-10-CM

## 2019-01-18 PROBLEM — I10 ESSENTIAL (PRIMARY) HYPERTENSION: Chronic | Status: ACTIVE | Noted: 2018-12-20

## 2019-01-18 PROBLEM — C50.412 MALIGNANT NEOPLASM OF UPPER-OUTER QUADRANT OF LEFT FEMALE BREAST: Chronic | Status: ACTIVE | Noted: 2018-12-20

## 2019-01-24 ENCOUNTER — RECORD ABSTRACTING (OUTPATIENT)
Age: 84
End: 2019-01-24

## 2019-01-24 DIAGNOSIS — R31.9 HEMATURIA, UNSPECIFIED: ICD-10-CM

## 2019-01-24 DIAGNOSIS — G47.00 INSOMNIA, UNSPECIFIED: ICD-10-CM

## 2019-01-24 DIAGNOSIS — R55 SYNCOPE AND COLLAPSE: ICD-10-CM

## 2019-01-24 DIAGNOSIS — Z82.49 FAMILY HISTORY OF ISCHEMIC HEART DISEASE AND OTHER DISEASES OF THE CIRCULATORY SYSTEM: ICD-10-CM

## 2019-01-24 RX ORDER — HYDROXYZINE HYDROCHLORIDE 10 MG/1
10 TABLET ORAL
Refills: 0 | Status: ACTIVE | COMMUNITY

## 2019-01-25 ENCOUNTER — APPOINTMENT (OUTPATIENT)
Dept: INTERNAL MEDICINE | Facility: CLINIC | Age: 84
End: 2019-01-25
Payer: MEDICARE

## 2019-01-25 VITALS — DIASTOLIC BLOOD PRESSURE: 74 MMHG | SYSTOLIC BLOOD PRESSURE: 114 MMHG

## 2019-01-25 VITALS — WEIGHT: 114 LBS | HEIGHT: 61 IN | BODY MASS INDEX: 21.52 KG/M2

## 2019-01-25 DIAGNOSIS — I10 ESSENTIAL (PRIMARY) HYPERTENSION: ICD-10-CM

## 2019-01-25 DIAGNOSIS — R53.83 OTHER FATIGUE: ICD-10-CM

## 2019-01-25 DIAGNOSIS — S32.9XXA FRACTURE OF UNSPECIFIED PARTS OF LUMBOSACRAL SPINE AND PELVIS, INITIAL ENCOUNTER FOR CLOSED FRACTURE: ICD-10-CM

## 2019-01-25 DIAGNOSIS — F03.90 UNSPECIFIED DEMENTIA W/OUT BEHAVIORAL DISTURBANCE: ICD-10-CM

## 2019-01-25 DIAGNOSIS — C50.919 MALIGNANT NEOPLASM OF UNSPECIFIED SITE OF UNSPECIFIED FEMALE BREAST: ICD-10-CM

## 2019-01-25 DIAGNOSIS — E53.8 DEFICIENCY OF OTHER SPECIFIED B GROUP VITAMINS: ICD-10-CM

## 2019-01-25 DIAGNOSIS — M48.00 SPINAL STENOSIS, SITE UNSPECIFIED: ICD-10-CM

## 2019-01-25 PROCEDURE — 36415 COLL VENOUS BLD VENIPUNCTURE: CPT

## 2019-01-25 PROCEDURE — 99495 TRANSJ CARE MGMT MOD F2F 14D: CPT | Mod: 25

## 2019-01-25 NOTE — PLAN
[FreeTextEntry1] : Stop amlodipine\par Cut lisinopril in half\par Continue low-dose hydrochlorothiazide\par Check bloods\par Follow blood pressure and 2-3 weeks

## 2019-01-25 NOTE — REVIEW OF SYSTEMS
[Fatigue] : fatigue [Recent Change In Weight] : ~T recent weight change [Joint Pain] : joint pain [Confusion] : confusion [Memory Loss] : memory loss [Unsteady Walking] : ataxia [Negative] : Heme/Lymph [Fever] : no fever [Chills] : no chills [Night Sweats] : no night sweats [Abdominal Pain] : no abdominal pain [Nausea] : no nausea [Vomiting] : no vomiting [Heartburn] : no heartburn [Joint Stiffness] : no joint stiffness [Headache] : no headache [Dizziness] : no dizziness [Fainting] : no fainting [Suicidal] : not suicidal [Insomnia] : no insomnia [Anxiety] : no anxiety [de-identified] : uses walker or cane

## 2019-01-25 NOTE — HISTORY OF PRESENT ILLNESS
[Post-hospitalization from ___ Hospital] : Post-hospitalization from [unfilled] Hospital [Admitted on: ___] : The patient was admitted on [unfilled] [Discharged on ___] : discharged on [unfilled] [FreeTextEntry3] : left [facility] Jan 13 2019, here for follow up [FreeTextEntry2] : PT OT and nursing coming to house\par PT last visit next week\par BP was low at times, meds were lowered\par at discharge she was instructed to restart her meds at prior doses\par not eating as much\par daughter finds she is fatigued\par dementia\par needs assistance in most aspects of ADL\par using cane and walker

## 2019-01-25 NOTE — PHYSICAL EXAM
[No Acute Distress] : no acute distress [Well Developed] : well developed [Normal Sclera/Conjunctiva] : normal sclera/conjunctiva [PERRL] : pupils equal round and reactive to light [Normal Outer Ear/Nose] : the outer ears and nose were normal in appearance [Normal Oropharynx] : the oropharynx was normal [No JVD] : no jugular venous distention [Supple] : supple [No Lymphadenopathy] : no lymphadenopathy [Thyroid Normal, No Nodules] : the thyroid was normal and there were no nodules present [No Respiratory Distress] : no respiratory distress  [Clear to Auscultation] : lungs were clear to auscultation bilaterally [No Accessory Muscle Use] : no accessory muscle use [Normal Rate] : normal rate  [Regular Rhythm] : with a regular rhythm [Normal S1, S2] : normal S1 and S2 [II] : a grade 2 [I] : a grade 1 [No Carotid Bruits] : no carotid bruits [No Abdominal Bruit] : a ~M bruit was not heard ~T in the abdomen [No Varicosities] : no varicosities [No Edema] : there was no peripheral edema [No Extremity Clubbing/Cyanosis] : no extremity clubbing/cyanosis [No Palpable Aorta] : no palpable aorta [Soft] : abdomen soft [Non Tender] : non-tender [Non-distended] : non-distended [No Masses] : no abdominal mass palpated [No HSM] : no HSM [Normal Bowel Sounds] : normal bowel sounds [Normal Posterior Cervical Nodes] : no posterior cervical lymphadenopathy [Normal Anterior Cervical Nodes] : no anterior cervical lymphadenopathy [No CVA Tenderness] : no CVA  tenderness [No Spinal Tenderness] : no spinal tenderness [No Joint Swelling] : no joint swelling [Grossly Normal Strength/Tone] : grossly normal strength/tone [No Rash] : no rash [No Focal Deficits] : no focal deficits [Deep Tendon Reflexes (DTR)] : deep tendon reflexes were 2+ and symmetric [de-identified] : dementia stable

## 2019-01-25 NOTE — ASSESSMENT
[FreeTextEntry1] : Status post pelvic fracture with blood pressure running low side and daughter finding patient seems fatigued\par Stable dementia\par Getting physical therapy to prevent future falls\par Fatigue and possible overmedication

## 2019-01-27 ENCOUNTER — RESULT REVIEW (OUTPATIENT)
Age: 84
End: 2019-01-27

## 2019-01-27 LAB
ALBUMIN SERPL ELPH-MCNC: 4.2 G/DL
ALP BLD-CCNC: 151 U/L
ALT SERPL-CCNC: 6 U/L
ANION GAP SERPL CALC-SCNC: 13 MMOL/L
AST SERPL-CCNC: 9 U/L
BASOPHILS # BLD AUTO: 0.03 K/UL
BASOPHILS NFR BLD AUTO: 0.5 %
BILIRUB SERPL-MCNC: 0.8 MG/DL
BUN SERPL-MCNC: 35 MG/DL
CALCIUM SERPL-MCNC: 9.6 MG/DL
CHLORIDE SERPL-SCNC: 102 MMOL/L
CK SERPL-CCNC: 655 U/L
CO2 SERPL-SCNC: 26 MMOL/L
CREAT SERPL-MCNC: 1.51 MG/DL
EOSINOPHIL # BLD AUTO: 0.35 K/UL
EOSINOPHIL NFR BLD AUTO: 6.1 %
GLUCOSE SERPL-MCNC: 79 MG/DL
HCT VFR BLD CALC: 35.5 %
HGB BLD-MCNC: 11.3 G/DL
IMM GRANULOCYTES NFR BLD AUTO: 0.2 %
LYMPHOCYTES # BLD AUTO: 1.79 K/UL
LYMPHOCYTES NFR BLD AUTO: 31.1 %
MAN DIFF?: NORMAL
MCHC RBC-ENTMCNC: 30.9 PG
MCHC RBC-ENTMCNC: 31.8 GM/DL
MCV RBC AUTO: 97 FL
MONOCYTES # BLD AUTO: 0.66 K/UL
MONOCYTES NFR BLD AUTO: 11.5 %
NEUTROPHILS # BLD AUTO: 2.91 K/UL
NEUTROPHILS NFR BLD AUTO: 50.6 %
PLATELET # BLD AUTO: 143 K/UL
POTASSIUM SERPL-SCNC: 4.4 MMOL/L
PROT SERPL-MCNC: 7.2 G/DL
RBC # BLD: 3.66 M/UL
RBC # FLD: 13.1 %
SODIUM SERPL-SCNC: 141 MMOL/L
TSH SERPL-ACNC: 0.37 UIU/ML
VIT B12 SERPL-MCNC: 922 PG/ML
WBC # FLD AUTO: 5.75 K/UL

## 2019-06-04 ENCOUNTER — MEDICATION RENEWAL (OUTPATIENT)
Age: 84
End: 2019-06-04

## 2019-06-04 RX ORDER — OXYBUTYNIN CHLORIDE 10 MG/1
10 TABLET, EXTENDED RELEASE ORAL
Qty: 90 | Refills: 3 | Status: ACTIVE | COMMUNITY
Start: 1900-01-01 | End: 1900-01-01

## 2019-09-03 ENCOUNTER — MEDICATION RENEWAL (OUTPATIENT)
Age: 84
End: 2019-09-03

## 2019-10-14 RX ORDER — RISPERIDONE 0.5 MG/1
0.5 TABLET, FILM COATED ORAL
Qty: 90 | Refills: 1 | Status: ACTIVE | COMMUNITY
Start: 1900-01-01 | End: 1900-01-01

## 2019-10-25 ENCOUNTER — EMERGENCY (EMERGENCY)
Facility: HOSPITAL | Age: 84
LOS: 0 days | Discharge: HOME | End: 2019-10-25
Attending: STUDENT IN AN ORGANIZED HEALTH CARE EDUCATION/TRAINING PROGRAM
Payer: MEDICARE

## 2019-10-25 VITALS
TEMPERATURE: 98 F | OXYGEN SATURATION: 99 % | RESPIRATION RATE: 18 BRPM | DIASTOLIC BLOOD PRESSURE: 77 MMHG | SYSTOLIC BLOOD PRESSURE: 167 MMHG | HEART RATE: 79 BPM

## 2019-10-25 VITALS
TEMPERATURE: 98 F | DIASTOLIC BLOOD PRESSURE: 74 MMHG | OXYGEN SATURATION: 99 % | HEIGHT: 61 IN | HEART RATE: 76 BPM | WEIGHT: 119.93 LBS | RESPIRATION RATE: 20 BRPM | SYSTOLIC BLOOD PRESSURE: 154 MMHG

## 2019-10-25 DIAGNOSIS — S00.93XA CONTUSION OF UNSPECIFIED PART OF HEAD, INITIAL ENCOUNTER: ICD-10-CM

## 2019-10-25 DIAGNOSIS — Z79.82 LONG TERM (CURRENT) USE OF ASPIRIN: ICD-10-CM
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
## 2019-10-25 DIAGNOSIS — Z90.49 ACQUIRED ABSENCE OF OTHER SPECIFIED PARTS OF DIGESTIVE TRACT: Chronic | ICD-10-CM

## 2019-10-25 DIAGNOSIS — M25.552 PAIN IN LEFT HIP: ICD-10-CM

## 2019-10-25 DIAGNOSIS — M25.512 PAIN IN LEFT SHOULDER: ICD-10-CM

## 2019-10-25 DIAGNOSIS — Z88.5 ALLERGY STATUS TO NARCOTIC AGENT: ICD-10-CM

## 2019-10-25 DIAGNOSIS — M54.5 LOW BACK PAIN: ICD-10-CM

## 2019-10-25 DIAGNOSIS — Z90.12 ACQUIRED ABSENCE OF LEFT BREAST AND NIPPLE: Chronic | ICD-10-CM
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
## 2019-10-25 PROCEDURE — 70450 CT HEAD/BRAIN W/O DYE: CPT | Mod: 26

## 2019-10-25 PROCEDURE — 72170 X-RAY EXAM OF PELVIS: CPT | Mod: 26

## 2019-10-25 PROCEDURE — 72131 CT LUMBAR SPINE W/O DYE: CPT | Mod: 26

## 2019-10-25 PROCEDURE — 99284 EMERGENCY DEPT VISIT MOD MDM: CPT

## 2019-10-25 PROCEDURE — 73060 X-RAY EXAM OF HUMERUS: CPT | Mod: 26,LT

## 2019-10-25 NOTE — ED PROVIDER NOTE - PATIENT PORTAL LINK FT
You can access the FollowMyHealth Patient Portal offered by [facility] by registering at the following website: [email or web address]. By joining [employer]’s FollowMyHealth portal, you will also be able to view your health information using other applications (apps) compatible with our system.

## 2019-10-25 NOTE — ED ADULT TRIAGE NOTE - CHIEF COMPLAINT QUOTE
daughter states, " she fell last night on her left side, c/o left shoulder and hip pain , but last few days she has been having trouble speaking not sure if its related to her dementia "

## 2019-10-25 NOTE — ED PROVIDER NOTE - CARE PLAN
Principal Discharge DX:	Head contusion  Secondary Diagnosis:	Lower back pain  Secondary Diagnosis:	Hip pain, acute, left

## 2019-10-25 NOTE — ED PROVIDER NOTE - OBJECTIVE STATEMENT
89 year old female presents today brought with her daughter for evaluation, pt fell last night landing onto her whole left side, she did strike the left side of her head but no LOC +tenderness to the area (-) neck pain +lower back pain +left shoulder/upper arm pain +left hip pain (-) chest pain (-) sob (-) dizzy or lightheaded

## 2019-10-25 NOTE — ED ADULT NURSE NOTE - OBJECTIVE STATEMENT
Pt presented to the ed s/p mechanical fall last night. and is unable to raise the left arm. Denies any loc or head injury. Pt is not on any blood thinner. As per daughter pt has severe dementia

## 2019-10-25 NOTE — ED ADULT NURSE NOTE - PMH
Dementia    Hypertension, unspecified type    Malignant neoplasm of upper-outer quadrant of left female breast, unspecified estrogen receptor status  1980, s/p mastectomy no chemo or radiation, tamoxifen

## 2019-11-19 ENCOUNTER — RX RENEWAL (OUTPATIENT)
Age: 84
End: 2019-11-19

## 2019-12-20 ENCOUNTER — MEDICATION RENEWAL (OUTPATIENT)
Age: 84
End: 2019-12-20

## 2020-01-03 ENCOUNTER — RX RENEWAL (OUTPATIENT)
Age: 85
End: 2020-01-03

## 2020-01-03 RX ORDER — HYDROCHLOROTHIAZIDE 12.5 MG/1
12.5 TABLET ORAL DAILY
Qty: 90 | Refills: 1 | Status: COMPLETED | COMMUNITY
End: 2020-01-03

## 2020-01-03 RX ORDER — AMLODIPINE BESYLATE 5 MG/1
5 TABLET ORAL DAILY
Qty: 90 | Refills: 3 | Status: ACTIVE | COMMUNITY
Start: 1900-01-01 | End: 1900-01-01

## 2020-03-18 ENCOUNTER — EMERGENCY (EMERGENCY)
Facility: HOSPITAL | Age: 85
LOS: 0 days | Discharge: HOME | End: 2020-03-18
Attending: EMERGENCY MEDICINE
Payer: MEDICARE

## 2020-03-18 VITALS
TEMPERATURE: 98 F | RESPIRATION RATE: 18 BRPM | SYSTOLIC BLOOD PRESSURE: 155 MMHG | HEART RATE: 70 BPM | DIASTOLIC BLOOD PRESSURE: 68 MMHG | OXYGEN SATURATION: 98 %

## 2020-03-18 VITALS
TEMPERATURE: 98 F | SYSTOLIC BLOOD PRESSURE: 170 MMHG | HEART RATE: 71 BPM | RESPIRATION RATE: 18 BRPM | DIASTOLIC BLOOD PRESSURE: 65 MMHG | OXYGEN SATURATION: 99 %
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
## 2020-03-18 DIAGNOSIS — S76.012A STRAIN OF MUSCLE, FASCIA AND TENDON OF LEFT HIP, INITIAL ENCOUNTER: ICD-10-CM
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
## 2020-03-18 DIAGNOSIS — M25.552 PAIN IN LEFT HIP: ICD-10-CM

## 2020-03-18 DIAGNOSIS — F03.90 UNSPECIFIED DEMENTIA WITHOUT BEHAVIORAL DISTURBANCE: ICD-10-CM

## 2020-03-18 DIAGNOSIS — I10 ESSENTIAL (PRIMARY) HYPERTENSION: ICD-10-CM

## 2020-03-18 DIAGNOSIS — Z90.49 ACQUIRED ABSENCE OF OTHER SPECIFIED PARTS OF DIGESTIVE TRACT: Chronic | ICD-10-CM

## 2020-03-18 DIAGNOSIS — M25.572 PAIN IN LEFT ANKLE AND JOINTS OF LEFT FOOT: ICD-10-CM

## 2020-03-18 DIAGNOSIS — Z88.5 ALLERGY STATUS TO NARCOTIC AGENT: ICD-10-CM

## 2020-03-18 DIAGNOSIS — Z90.12 ACQUIRED ABSENCE OF LEFT BREAST AND NIPPLE: Chronic | ICD-10-CM

## 2020-03-18 LAB
ALBUMIN SERPL ELPH-MCNC: 3.3 G/DL — SIGNIFICANT CHANGE UP (ref 3.3–5)
ALP SERPL-CCNC: 152 U/L — HIGH (ref 40–120)
ALT FLD-CCNC: 12 U/L — SIGNIFICANT CHANGE UP (ref 12–78)
ANION GAP SERPL CALC-SCNC: 6 MMOL/L — SIGNIFICANT CHANGE UP (ref 5–17)
AST SERPL-CCNC: 14 U/L — LOW (ref 15–37)
BASOPHILS # BLD AUTO: 0.02 K/UL — SIGNIFICANT CHANGE UP (ref 0–0.2)
BASOPHILS NFR BLD AUTO: 0.3 % — SIGNIFICANT CHANGE UP (ref 0–2)
BILIRUB SERPL-MCNC: 0.6 MG/DL — SIGNIFICANT CHANGE UP (ref 0.2–1.2)
BUN SERPL-MCNC: 21 MG/DL — SIGNIFICANT CHANGE UP (ref 7–23)
CALCIUM SERPL-MCNC: 8.8 MG/DL — SIGNIFICANT CHANGE UP (ref 8.5–10.1)
CHLORIDE SERPL-SCNC: 109 MMOL/L — HIGH (ref 96–108)
CO2 SERPL-SCNC: 26 MMOL/L — SIGNIFICANT CHANGE UP (ref 22–31)
CREAT SERPL-MCNC: 1.24 MG/DL — SIGNIFICANT CHANGE UP (ref 0.5–1.3)
EOSINOPHIL # BLD AUTO: 0.07 K/UL — SIGNIFICANT CHANGE UP (ref 0–0.5)
EOSINOPHIL NFR BLD AUTO: 1.2 % — SIGNIFICANT CHANGE UP (ref 0–6)
GLUCOSE SERPL-MCNC: 99 MG/DL — SIGNIFICANT CHANGE UP (ref 70–99)
HCT VFR BLD CALC: 34.6 % — SIGNIFICANT CHANGE UP (ref 34.5–45)
HGB BLD-MCNC: 11.2 G/DL — LOW (ref 11.5–15.5)
IMM GRANULOCYTES NFR BLD AUTO: 0.3 % — SIGNIFICANT CHANGE UP (ref 0–1.5)
LYMPHOCYTES # BLD AUTO: 1.8 K/UL — SIGNIFICANT CHANGE UP (ref 1–3.3)
LYMPHOCYTES # BLD AUTO: 30.2 % — SIGNIFICANT CHANGE UP (ref 13–44)
MCHC RBC-ENTMCNC: 30.7 PG — SIGNIFICANT CHANGE UP (ref 27–34)
MCHC RBC-ENTMCNC: 32.4 GM/DL — SIGNIFICANT CHANGE UP (ref 32–36)
MCV RBC AUTO: 94.8 FL — SIGNIFICANT CHANGE UP (ref 80–100)
MONOCYTES # BLD AUTO: 0.7 K/UL — SIGNIFICANT CHANGE UP (ref 0–0.9)
MONOCYTES NFR BLD AUTO: 11.7 % — SIGNIFICANT CHANGE UP (ref 2–14)
NEUTROPHILS # BLD AUTO: 3.36 K/UL — SIGNIFICANT CHANGE UP (ref 1.8–7.4)
NEUTROPHILS NFR BLD AUTO: 56.3 % — SIGNIFICANT CHANGE UP (ref 43–77)
NRBC # BLD: 0 /100 WBCS — SIGNIFICANT CHANGE UP (ref 0–0)
PLATELET # BLD AUTO: 157 K/UL — SIGNIFICANT CHANGE UP (ref 150–400)
POTASSIUM SERPL-MCNC: 4.3 MMOL/L — SIGNIFICANT CHANGE UP (ref 3.5–5.3)
POTASSIUM SERPL-SCNC: 4.3 MMOL/L — SIGNIFICANT CHANGE UP (ref 3.5–5.3)
PROT SERPL-MCNC: 7.3 GM/DL — SIGNIFICANT CHANGE UP (ref 6–8.3)
RBC # BLD: 3.65 M/UL — LOW (ref 3.8–5.2)
RBC # FLD: 13.2 % — SIGNIFICANT CHANGE UP (ref 10.3–14.5)
SODIUM SERPL-SCNC: 141 MMOL/L — SIGNIFICANT CHANGE UP (ref 135–145)
WBC # BLD: 5.97 K/UL — SIGNIFICANT CHANGE UP (ref 3.8–10.5)
WBC # FLD AUTO: 5.97 K/UL — SIGNIFICANT CHANGE UP (ref 3.8–10.5)

## 2020-03-18 PROCEDURE — 99284 EMERGENCY DEPT VISIT MOD MDM: CPT

## 2020-03-18 PROCEDURE — 70450 CT HEAD/BRAIN W/O DYE: CPT | Mod: 26

## 2020-03-18 PROCEDURE — 73610 X-RAY EXAM OF ANKLE: CPT | Mod: 26,LT

## 2020-03-18 PROCEDURE — 73502 X-RAY EXAM HIP UNI 2-3 VIEWS: CPT | Mod: 26,LT

## 2020-03-18 NOTE — ED PROVIDER NOTE - NSFOLLOWUPCLINICS_GEN_ALL_ED_FT
[facility]  Orthopedic Surgery  [address], 3rd & 4th [place] [address]  Phone: [phone]  Fax:   Follow Up Time:

## 2020-03-18 NOTE — ED PROVIDER NOTE - PATIENT PORTAL LINK FT
You can access the FollowMyHealth Patient Portal offered by [facility] by registering at the following website: [email or web address]. By joining [employer]’s FollowMyHealth portal, you will also be able to view your health information using other applications (apps) compatible with our system.

## 2020-03-18 NOTE — ED ADULT NURSE NOTE - OBJECTIVE STATEMENT
Pt aox1, BIBA from home, daughter at bed side, s/p fall in bathroom. As per daughter, pt fell hitting her head with toilet, daughter denies LOC. No active bleeding noted.

## 2020-03-18 NOTE — ED ADULT TRIAGE NOTE - CHIEF COMPLAINT QUOTE
pt status post trip and fall at home. pt complaining of left hip pain. positive head injury. denies LOC. denies being on blood thinners.

## 2020-03-18 NOTE — ED PROVIDER NOTE - CLINICAL SUMMARY MEDICAL DECISION MAKING FREE TEXT BOX
labs and diagnostic imaging results reviewed with patient and daughter; patient able to ambulate without difficulty; PMD or clinic follow up recommended for reassessment. Patient is aware of signs/symptoms to return to the emergency department.

## 2020-03-18 NOTE — ED ADULT NURSE NOTE - NSIMPLEMENTINTERV_GEN_ALL_ED
Implemented All Fall with Harm Risk Interventions:  [place] to call system. Call bell, personal items and telephone within reach. Instruct patient to call for assistance. Room bathroom lighting operational. Non-slip footwear when patient is off stretcher. Physically safe environment: no spills, clutter or unnecessary equipment. Stretcher in lowest position, wheels locked, appropriate side rails in place. Provide visual cue, wrist band, yellow gown, etc. Monitor gait and stability. Monitor for mental status changes and reorient to person, place, and time. Review medications for side effects contributing to fall risk. Reinforce activity limits and safety measures with patient and family. Provide visual clues: red socks.

## 2020-04-29 ENCOUNTER — INPATIENT (INPATIENT)
Facility: HOSPITAL | Age: 85
LOS: 4 days | Discharge: NURSING, REHAB OR LONG-TERM CARE | End: 2020-05-04
Attending: INTERNAL MEDICINE | Admitting: INTERNAL MEDICINE
Payer: MEDICARE

## 2020-04-29 VITALS
RESPIRATION RATE: 18 BRPM | DIASTOLIC BLOOD PRESSURE: 83 MMHG | SYSTOLIC BLOOD PRESSURE: 130 MMHG | HEART RATE: 80 BPM | OXYGEN SATURATION: 97 %
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
## 2020-04-29 DIAGNOSIS — I10 ESSENTIAL (PRIMARY) HYPERTENSION: ICD-10-CM

## 2020-04-29 DIAGNOSIS — Z90.49 ACQUIRED ABSENCE OF OTHER SPECIFIED PARTS OF DIGESTIVE TRACT: Chronic | ICD-10-CM

## 2020-04-29 DIAGNOSIS — N30.00 ACUTE CYSTITIS WITHOUT HEMATURIA: ICD-10-CM

## 2020-04-29 DIAGNOSIS — F02.80 DEMENTIA IN OTHER DISEASES CLASSIFIED ELSEWHERE, UNSPECIFIED SEVERITY, WITHOUT BEHAVIORAL DISTURBANCE, PSYCHOTIC DISTURBANCE, MOOD DISTURBANCE, AND ANXIETY: ICD-10-CM

## 2020-04-29 DIAGNOSIS — Z90.12 ACQUIRED ABSENCE OF LEFT BREAST AND NIPPLE: Chronic | ICD-10-CM

## 2020-04-29 LAB
ALBUMIN SERPL ELPH-MCNC: 3.2 G/DL — LOW (ref 3.3–5)
ALP SERPL-CCNC: 130 U/L — HIGH (ref 40–120)
ALT FLD-CCNC: 14 U/L — SIGNIFICANT CHANGE UP (ref 12–78)
ANION GAP SERPL CALC-SCNC: 8 MMOL/L — SIGNIFICANT CHANGE UP (ref 5–17)
APPEARANCE UR: ABNORMAL
AST SERPL-CCNC: 15 U/L — SIGNIFICANT CHANGE UP (ref 15–37)
BILIRUB SERPL-MCNC: 1.3 MG/DL — HIGH (ref 0.2–1.2)
BILIRUB UR-MCNC: NEGATIVE — SIGNIFICANT CHANGE UP
BUN SERPL-MCNC: 34 MG/DL — HIGH (ref 7–23)
CALCIUM SERPL-MCNC: 8.5 MG/DL — SIGNIFICANT CHANGE UP (ref 8.5–10.1)
CHLORIDE SERPL-SCNC: 110 MMOL/L — HIGH (ref 96–108)
CO2 SERPL-SCNC: 25 MMOL/L — SIGNIFICANT CHANGE UP (ref 22–31)
COLOR SPEC: YELLOW — SIGNIFICANT CHANGE UP
CREAT SERPL-MCNC: 1.65 MG/DL — HIGH (ref 0.5–1.3)
DIFF PNL FLD: ABNORMAL
GLUCOSE BLDC GLUCOMTR-MCNC: 132 MG/DL — HIGH (ref 70–99)
GLUCOSE SERPL-MCNC: 117 MG/DL — HIGH (ref 70–99)
GLUCOSE UR QL: NEGATIVE MG/DL — SIGNIFICANT CHANGE UP
HCT VFR BLD CALC: 34.4 % — LOW (ref 34.5–45)
HGB BLD-MCNC: 11 G/DL — LOW (ref 11.5–15.5)
KETONES UR-MCNC: NEGATIVE — SIGNIFICANT CHANGE UP
LEUKOCYTE ESTERASE UR-ACNC: ABNORMAL
MCHC RBC-ENTMCNC: 30.6 PG — SIGNIFICANT CHANGE UP (ref 27–34)
MCHC RBC-ENTMCNC: 32 GM/DL — SIGNIFICANT CHANGE UP (ref 32–36)
MCV RBC AUTO: 95.8 FL — SIGNIFICANT CHANGE UP (ref 80–100)
NITRITE UR-MCNC: NEGATIVE — SIGNIFICANT CHANGE UP
NRBC # BLD: 0 /100 WBCS — SIGNIFICANT CHANGE UP (ref 0–0)
PH UR: 8 — SIGNIFICANT CHANGE UP (ref 5–8)
PLATELET # BLD AUTO: 135 K/UL — LOW (ref 150–400)
POTASSIUM SERPL-MCNC: 3.4 MMOL/L — LOW (ref 3.5–5.3)
POTASSIUM SERPL-SCNC: 3.4 MMOL/L — LOW (ref 3.5–5.3)
PROT SERPL-MCNC: 7.3 GM/DL — SIGNIFICANT CHANGE UP (ref 6–8.3)
PROT UR-MCNC: 100 MG/DL
RBC # BLD: 3.59 M/UL — LOW (ref 3.8–5.2)
RBC # FLD: 13.6 % — SIGNIFICANT CHANGE UP (ref 10.3–14.5)
SODIUM SERPL-SCNC: 143 MMOL/L — SIGNIFICANT CHANGE UP (ref 135–145)
SP GR SPEC: 1.01 — SIGNIFICANT CHANGE UP (ref 1.01–1.02)
TROPONIN I SERPL-MCNC: 0.02 NG/ML — SIGNIFICANT CHANGE UP (ref 0.01–0.04)
UROBILINOGEN FLD QL: NEGATIVE MG/DL — SIGNIFICANT CHANGE UP
WBC # BLD: 8.07 K/UL — SIGNIFICANT CHANGE UP (ref 3.8–10.5)
WBC # FLD AUTO: 8.07 K/UL — SIGNIFICANT CHANGE UP (ref 3.8–10.5)

## 2020-04-29 PROCEDURE — 70450 CT HEAD/BRAIN W/O DYE: CPT | Mod: 26

## 2020-04-29 PROCEDURE — 73502 X-RAY EXAM HIP UNI 2-3 VIEWS: CPT | Mod: 26,LT

## 2020-04-29 PROCEDURE — 93010 ELECTROCARDIOGRAM REPORT: CPT

## 2020-04-29 PROCEDURE — 71045 X-RAY EXAM CHEST 1 VIEW: CPT | Mod: 26

## 2020-04-29 PROCEDURE — 99285 EMERGENCY DEPT VISIT HI MDM: CPT

## 2020-04-29 PROCEDURE — 76377 3D RENDER W/INTRP POSTPROCES: CPT | Mod: 26

## 2020-04-29 PROCEDURE — 72192 CT PELVIS W/O DYE: CPT | Mod: 26

## 2020-04-29 PROCEDURE — 99223 1ST HOSP IP/OBS HIGH 75: CPT | Mod: CS,AI

## 2020-04-29 PROCEDURE — 73552 X-RAY EXAM OF FEMUR 2/>: CPT | Mod: 26,LT

## 2020-04-29 RX ORDER — CEFTRIAXONE 500 MG/1
1000 INJECTION, POWDER, FOR SOLUTION INTRAMUSCULAR; INTRAVENOUS ONCE
Refills: 0 | Status: COMPLETED | OUTPATIENT
Start: 2020-04-29 | End: 2020-04-29

## 2020-04-29 RX ORDER — CEFTRIAXONE 500 MG/1
1000 INJECTION, POWDER, FOR SOLUTION INTRAMUSCULAR; INTRAVENOUS EVERY 24 HOURS
Refills: 0 | Status: DISCONTINUED | OUTPATIENT
Start: 2020-04-29 | End: 2020-05-03

## 2020-04-29 RX ORDER — ACETAMINOPHEN 500 MG
650 TABLET ORAL ONCE
Refills: 0 | Status: COMPLETED | OUTPATIENT
Start: 2020-04-29 | End: 2020-04-29

## 2020-04-29 RX ORDER — ENOXAPARIN SODIUM 100 MG/ML
40 INJECTION SUBCUTANEOUS DAILY
Refills: 0 | Status: DISCONTINUED | OUTPATIENT
Start: 2020-04-29 | End: 2020-04-30

## 2020-04-29 RX ORDER — POTASSIUM CHLORIDE 20 MEQ
40 PACKET (EA) ORAL ONCE
Refills: 0 | Status: COMPLETED | OUTPATIENT
Start: 2020-04-29 | End: 2020-04-29

## 2020-04-29 RX ORDER — ACETAMINOPHEN 500 MG
650 TABLET ORAL EVERY 6 HOURS
Refills: 0 | Status: DISCONTINUED | OUTPATIENT
Start: 2020-04-29 | End: 2020-05-04

## 2020-04-29 RX ADMIN — CEFTRIAXONE 100 MILLIGRAM(S): 500 INJECTION, POWDER, FOR SOLUTION INTRAMUSCULAR; INTRAVENOUS at 11:45

## 2020-04-29 RX ADMIN — CEFTRIAXONE 1000 MILLIGRAM(S): 500 INJECTION, POWDER, FOR SOLUTION INTRAMUSCULAR; INTRAVENOUS at 14:02

## 2020-04-29 RX ADMIN — Medication 40 MILLIEQUIVALENT(S): at 13:27

## 2020-04-29 RX ADMIN — Medication 650 MILLIGRAM(S): at 10:38

## 2020-04-29 NOTE — PHYSICAL THERAPY INITIAL EVALUATION ADULT - PERTINENT HX OF CURRENT PROBLEM, REHAB EVAL
Pt admitted after fall at home, slurred speech as per ED recovered from slurring, pt is confused, incoherent and disoriented.

## 2020-04-29 NOTE — ED PROVIDER NOTE - SECONDARY DIAGNOSIS.
Fall, initial encounter Dementia associated with other underlying disease without behavioral disturbance Acute kidney injury

## 2020-04-29 NOTE — PATIENT PROFILE ADULT - DISASTER - FUNCTIONAL SCREEN CURRENT LEVEL: SWALLOWING (IF SCORE 2 OR MORE FOR ANY ITEM, CONSULT REHAB SERVICES), MLM)
Kegel Exercises: Care Instructions  Your Care Instructions    Kegel exercises strengthen muscles around the bladder. These muscles control the flow of urine. Kegel exercises are sometime called \"pelvic floor\" exercises. They can help prevent urine leakage and keep the pelvic organs in place. A woman who just had a baby might want to try Kegel exercises. They can strengthen pelvic muscles that have been weakened by pregnancy and childbirth. A man or woman may use Kegel exercises to treat urine leakage. You do Kegel exercises by tightening the muscles you use when you urinate. You will likely need to do these exercises for several weeks to get better. Follow-up care is a key part of your treatment and safety. Be sure to make and go to all appointments, and call your doctor if you are having problems. It's also a good idea to know your test results and keep a list of the medicines you take. How can you care for yourself at home? · Do Kegel exercises. ? Find the muscles you need to strengthen. To do this, tighten the muscles that stop your urine while you are going to the bathroom. These are the same muscles you squeeze during Kegel exercises. ? Squeeze the muscles as hard as you can. Your belly and thighs should not move. ? Hold the squeeze for 3 seconds. Then relax for 3 seconds. ? Start with 3 seconds, and then add 1 second each week until you are able to squeeze for 10 seconds. ? Repeat the exercise 10 to 15 times for each session. Do three or more sessions each day. · You can check to see if you are using the right muscles. Place a finger in your vagina and squeeze around it. You are doing them right when you feel pressure around your finger. Your doctor may also suggest that you put special weights in your vagina while you do the exercises. · Do not smoke. It can irritate the bladder. If you need help quitting, talk to your doctor about stop-smoking programs and medicines.  These can increase your chances of quitting for good. Where can you learn more? Go to http://lissett-jess.info/. Enter G315 in the search box to learn more about \"Kegel Exercises: Care Instructions. \"  Current as of: December 19, 2018  Content Version: 12.1  © 1647-4404 [employer]. Care instructions adapted under license by [employer] (which disclaims liability or warranty for this information). If you have questions about a medical condition or this instruction, always ask your healthcare professional. [address] any warranty or liability for your use of this information. 0 = swallows foods/liquids without difficulty

## 2020-04-29 NOTE — ED PROVIDER NOTE - CARE PLAN
Principal Discharge DX:	Dehydration  Secondary Diagnosis:	Fall, initial encounter  Secondary Diagnosis:	Dementia associated with other underlying disease without behavioral disturbance Principal Discharge DX:	Dehydration  Secondary Diagnosis:	Fall, initial encounter  Secondary Diagnosis:	Dementia associated with other underlying disease without behavioral disturbance  Secondary Diagnosis:	Acute kidney injury

## 2020-04-29 NOTE — ED PROVIDER NOTE - PHYSICAL EXAMINATION
I have reviewed the triage vital signs.    Const: Well-nourished, Well-developed, appearing stated age  Head: atraumatic, normocephalic  Eyes: no conjunctival injection and no scleral icterus  ENMT: Atraumatic external nose and ears, Moist mucus membranes  Neck: Symmetric, trachea midline,  CVS: +S1/S2, dorsalis pedis/radial pulse 2+ bilaterally  RESP: Unlabored respiratory effort, Clear to auscultation bilaterally  GI: Nontender/Nondistended, soft abdomen  MSK: Extremities w/o deformity or ttp   Skin: Warm, Dry w/ no rashes  Neuro: GCS=15, CNs II-XII grossly intact, motor in all 4 extremities equal, Sensation grossly intact   Psych: Awake, Alert, & Orientedx3;  Appropriate mood and affect, cooperative

## 2020-04-29 NOTE — PHYSICAL THERAPY INITIAL EVALUATION ADULT - TRANSFER TRAINING, PT EVAL
Independent/supervision in transfers bed<>chair, toilet transfers with rolling walker or cane assistive device.

## 2020-04-29 NOTE — H&P ADULT - HISTORY OF PRESENT ILLNESS
89 F w/ PMH of HTN, dementia, presents to the ED for fall. Report from medic is inconsistent state pt has slurred speech -however pts speech isn't slurred in the department, reports slurred speech started at 6AM, unclear what time it occurred.   Pt also had a fall today in the bathroom, per medics didn't hit anything, pt seems to have mild pain, but cannot state where the pain is.

## 2020-04-29 NOTE — PHYSICAL THERAPY INITIAL EVALUATION ADULT - GAIT TRAINING, PT EVAL
Independent/supervision in ambulation with use of cane or rolling walker device up to 200 feet observing proper gait pattern, posture and prevent falls.

## 2020-04-29 NOTE — PHYSICAL THERAPY INITIAL EVALUATION ADULT - IMPAIRMENTS FOUND, PT EVAL
aerobic capacity/endurance/fine motor/muscle strength/cognitive impairment/ergonomics and body mechanics/gait, locomotion, and balance

## 2020-04-29 NOTE — ED ADULT NURSE NOTE - NSIMPLEMENTINTERV_GEN_ALL_ED
Implemented All Fall with Harm Risk Interventions:  [place] to call system. Call bell, personal items and telephone within reach. Instruct patient to call for assistance. Room bathroom lighting operational. Non-slip footwear when patient is off stretcher. Physically safe environment: no spills, clutter or unnecessary equipment. Stretcher in lowest position, wheels locked, appropriate side rails in place. Provide visual cue, wrist band, yellow gown, etc. Monitor gait and stability. Monitor for mental status changes and reorient to person, place, and time. Review medications for side effects contributing to fall risk. Reinforce activity limits and safety measures with patient and family. Provide visual clues: red socks.

## 2020-04-29 NOTE — ED PROVIDER NOTE - PROGRESS NOTE DETAILS
Mireya Jackson MD phone call to pts daughter w/ no response Mireya Jackson MD another phone call to pts daughter w/ no response Mireya Jackson MD spoke w/ granddaughter, that pt fell yesterday, unknown why she fell; she didn't hit her head, and then after, pt was having soreness- unknown where the pain was. Was treated w/ tylenol yesterday. Slurred speech for several months. spoke to primary transferred into a rehab center, spoke w/ Veronica Roman granddaughter Mireya Jackson MD spoke w/ daughter that she spoke w/ maximiliano lorenz is PCP, he recommended admission for placement in rehab given recurrent falls. Mireya Jackson MD spoke w/ daughter and updated her regarding plan to admit pt w/ uti, alina,

## 2020-04-29 NOTE — H&P ADULT - NSHPPHYSICALEXAM_GEN_ALL_CORE
ICU Vital Signs Last 24 Hrs  T(C): --  T(F): --  HR: 79 (29 Apr 2020 13:32) (79 - 80)  BP: 166/65 (29 Apr 2020 13:32) (130/83 - 166/65)  BP(mean): --  ABP: --  ABP(mean): --  RR: 15 (29 Apr 2020 13:32) (15 - 18)  SpO2: 98% (29 Apr 2020 13:32) (97% - 98%)  GENERAL: NAD well-developed  HEAD:  Atraumatic, Normocephalic  EYES: EOMI, PERRLA, conjunctiva and sclera clear  ENMT: No tonsillar erythema, exudates, or enlargement; Moist mucous membranes, Good dentition, No lesions  NECK: Supple, No JVD, Normal thyroid  NERVOUS SYSTEM:  Alert & Oriented X3, Good concentration; Motor Strength 5/5 B/L upper and lower extremities; DTRs 2+ intact and symmetric  CHEST/LUNG: Clear to percussion bilaterally; No rales, rhonchi, wheezing, or rubs  HEART: Regular rate and rhythm; No murmurs, rubs, or gallops  ABDOMEN: Soft, Nontender, Nondistended; Bowel sounds present  EXTREMITIES:  2+ Peripheral Pulses, No clubbing, cyanosis, or edema  LYMPH: No lymphadenopathy   SKIN: No rashes or lesions

## 2020-04-29 NOTE — PHYSICAL THERAPY INITIAL EVALUATION ADULT - STRENGTHENING, PT EVAL
Improve strength in the UE and LE to 5/5 and be able to perform functional tasks-bed mobility, sitting, standing, transfers and ambulate in a safe manner with assistive device and prevent falls.

## 2020-04-29 NOTE — H&P ADULT - NSICDXPASTMEDICALHX_GEN_ALL_CORE_FT
PAST MEDICAL HISTORY:  Dementia     Hypertension, unspecified type     Malignant neoplasm of upper-outer quadrant of left female breast, unspecified estrogen receptor status 1980, s/p mastectomy no chemo or radiation, tamoxifen

## 2020-04-29 NOTE — PHYSICAL THERAPY INITIAL EVALUATION ADULT - BALANCE TRAINING, PT EVAL
Improve sitting, standing and ambulation balance to a safe level using appropriate assistive device either a cane or a rolling walker.

## 2020-04-29 NOTE — ED PROVIDER NOTE - CLINICAL SUMMARY MEDICAL DECISION MAKING FREE TEXT BOX
89 F w/ hx of dementia, p/w failure to thrive, decreased PO intake, increasing falls. Daughter spoke w/ PCP and  who recommended ED eval for placement. Plan for labs, ekg and ct scan to eval if medically stable for transfer

## 2020-04-29 NOTE — PHYSICAL THERAPY INITIAL EVALUATION ADULT - ADDITIONAL COMMENTS
As per pt's daughter the patient able to ambulate for short distances around the house until 3-4 days when she started having weakness and having difficulty in ambulation.

## 2020-04-29 NOTE — H&P ADULT - ASSESSMENT
89 years old female with dementia with fall  and urinary tract infection         IMPROVE VTE Individual Risk Assessment          RISK                                                          Points  [  ] Previous VTE                                                3  [  ] Thrombophilia                                             2  [  ] Lower limb paralysis                                   2        (unable to hold up >15 seconds)    [  ] Current Cancer                                             2         (within 6 months)  [  ] Immobilization > 24 hrs                              1  [  ] ICU/CCU stay > 24 hours                             1  [  ] Age > 60                                                         1    IMPROVE VTE Score: 2

## 2020-04-29 NOTE — ED PROVIDER NOTE - OBJECTIVE STATEMENT
89 F w/ PMH of HTN, dementia, presents to the ED for fall. Report from medic is inconsistent state pt has slurred speech however pts speech isn't slurred in the department, reports slurred speech started at 6AM, unclear what time it occurred.   Pt also had a fall today in the bathroom, per medics didn't hit anything, pt seems to have mild pain, but cannot state where the pain is. 89 F w/ PMH of HTN, dementia, presents to the ED for fall. Report from medic is inconsistent state pt has slurred speech -however pts speech isn't slurred in the department, reports slurred speech started at 6AM, unclear what time it occurred.   Pt also had a fall today in the bathroom, per medics didn't hit anything, pt seems to have mild pain, but cannot state where the pain is.

## 2020-04-29 NOTE — PHYSICAL THERAPY INITIAL EVALUATION ADULT - LIVES WITH, PROFILE
As per patient's daughter, pt lives with her, pvt house, 4 steps with rails to enter the house, stays on main floor.

## 2020-04-30 PROCEDURE — 99233 SBSQ HOSP IP/OBS HIGH 50: CPT | Mod: CS

## 2020-04-30 RX ORDER — SODIUM CHLORIDE 9 MG/ML
1000 INJECTION, SOLUTION INTRAVENOUS
Refills: 0 | Status: DISCONTINUED | OUTPATIENT
Start: 2020-04-30 | End: 2020-05-01

## 2020-04-30 RX ORDER — ENOXAPARIN SODIUM 100 MG/ML
30 INJECTION SUBCUTANEOUS DAILY
Refills: 0 | Status: DISCONTINUED | OUTPATIENT
Start: 2020-04-30 | End: 2020-05-04

## 2020-04-30 RX ADMIN — CEFTRIAXONE 100 MILLIGRAM(S): 500 INJECTION, POWDER, FOR SOLUTION INTRAMUSCULAR; INTRAVENOUS at 13:27

## 2020-04-30 RX ADMIN — ENOXAPARIN SODIUM 30 MILLIGRAM(S): 100 INJECTION SUBCUTANEOUS at 12:44

## 2020-04-30 NOTE — PROGRESS NOTE ADULT - ASSESSMENT
89 F w/ PMH of HTN, dementia, presented after fall.  Found to have UTI, started on Rocephin.  Cr noted elevated at 1.6.      ·  Problem: Acute cystitis without hematuria.  Plan: Continue Rocephin, f/u cultures.   ·  Problem: Dementia associated with other underlying disease without behavioral disturbance.   ·  Problem: Fall, initial encounter.  Plan: physical therapy eval.   ·  Problem: Hypertension, unspecified type.  Plan: continue home meds.   ·  Problem: WODO - Cr 1.6, baseline ~ 1.2.  Start on IVF, repeat BMP in am.  # DVT Prophylaxis - Lovenox subcut  hopeful placement in [facility].  D/w pt's daughter at [phone]

## 2020-04-30 NOTE — PROGRESS NOTE ADULT - SUBJECTIVE AND OBJECTIVE BOX
Patient: GABRIEL CASTRO [id number] 89y Female                           Internal Medicine Hospitalist Progress Note    Pt confused.  Denies complaints.  No fevers noted.     ____________________PHYSICAL EXAM:  GENERAL:  NAD Alert, confused.   HEENT: NCAT  CARDIOVASCULAR:  S1, S2  LUNGS: CTAB  ABDOMEN:  soft, (-) tenderness, (-) distension, (+) bowel sounds, (-) guarding, (-) rebound (-) rigidity  EXTREMITIES:  no cyanosis / clubbing / edema.   ____________________     VITALS:  Vital Signs Last 24 Hrs  T(C): 36.2 (2020 11:17), Max: 36.6 (2020 15:32)  T(F): 97.1 (2020 11:17), Max: 97.8 (2020 15:32)  HR: 97 (2020 11:17) (70 - 97)  BP: 161/109 (2020 11:20) (108/89 - 161/109)  BP(mean): --  RR: 18 (2020 11:20) (16 - 18)  SpO2: 97% (2020 11:20) (94% - 98%) Daily Height in cm: 162.56 (2020 21:04)    Daily Weight in k.7 (2020 05:00)  CAPILLARY BLOOD GLUCOSE        I&O's Summary    2020 07:01  -  2020 15:03  --------------------------------------------------------  IN: 270 mL / OUT: 0 mL / NET: 270 mL          BACKGROUND:  HEALTH ISSUES - PROBLEM Dx:  Hypertension, unspecified type: Hypertension, unspecified type  Fall, initial encounter: Fall, initial encounter  Dementia associated with other underlying disease without behavioral disturbance: Dementia associated with other underlying disease without behavioral disturbance  Acute cystitis without hematuria: Acute cystitis without hematuria        Allergies    codeine (Other)    Intolerances      PAST MEDICAL & SURGICAL HISTORY:  Dementia  Hypertension, unspecified type  Malignant neoplasm of upper-outer quadrant of left female breast, unspecified estrogen receptor status: 1980, s/p mastectomy no chemo or radiation, tamoxifen  History of cholecystectomy  H/O mastectomy, left: &#x27;s        LABS:                        11.0   8.07  )-----------( 135      ( 2020 10:01 )             34.4       Culture - Urine (collected 2020 14:36)  Source: .Urine Clean Catch (Midstream)  Preliminary Report (2020 10:10):    >100,000 CFU/ml Gram Negative Rods        143  |  110<H>  |  34<H>  ----------------------------<  117<H>  3.4<L>   |  25  |  1.65<H>    Ca    8.5      2020 10:01    TPro  7.3  /  Alb  3.2<L>  /  TBili  1.3<H>  /  DBili  x   /  AST  15  /  ALT  14  /  AlkPhos  130<H>        LIVER FUNCTIONS - ( 2020 10:01 )  Alb: 3.2 g/dL / Pro: 7.3 gm/dL / ALK PHOS: 130 U/L / ALT: 14 U/L / AST: 15 U/L / GGT: x           Urinalysis Basic - ( 2020 10:28 )    Color: Yellow / Appearance: very cloudy / S.015 / pH: x  Gluc: x / Ketone: Negative  / Bili: Negative / Urobili: Negative mg/dL   Blood: x / Protein: 100 mg/dL / Nitrite: Negative   Leuk Esterase: Moderate / RBC: 0-2 /HPF / WBC 26-50   Sq Epi: x / Non Sq Epi: Few / Bacteria: Many      CARDIAC MARKERS ( 2020 10:01 )  .016 ng/mL / x     / x     / x     / x          Culture - Urine (collected 2020 14:36)  Source: .Urine Clean Catch (Midstream)  Preliminary Report (2020 10:10):    >100,000 CFU/ml Gram Negative Rods          MEDICATIONS:  MEDICATIONS  (STANDING):  cefTRIAXone   IVPB 1000 milliGRAM(s) IV Intermittent every 24 hours  enoxaparin Injectable 30 milliGRAM(s) SubCutaneous daily  sodium chloride 0.45%. 1000 milliLiter(s) (60 mL/Hr) IV Continuous <Continuous>    MEDICATIONS  (PRN):  acetaminophen   Tablet .. 650 milliGRAM(s) Oral every 6 hours PRN Temp greater or equal to 38C (100.4F), Moderate Pain (4 - 6)

## 2020-05-01 LAB
ANION GAP SERPL CALC-SCNC: 7 MMOL/L — SIGNIFICANT CHANGE UP (ref 5–17)
BUN SERPL-MCNC: 32 MG/DL — HIGH (ref 7–23)
CALCIUM SERPL-MCNC: 8.6 MG/DL — SIGNIFICANT CHANGE UP (ref 8.5–10.1)
CHLORIDE SERPL-SCNC: 113 MMOL/L — HIGH (ref 96–108)
CO2 SERPL-SCNC: 28 MMOL/L — SIGNIFICANT CHANGE UP (ref 22–31)
CREAT SERPL-MCNC: 1.43 MG/DL — HIGH (ref 0.5–1.3)
GLUCOSE SERPL-MCNC: 97 MG/DL — SIGNIFICANT CHANGE UP (ref 70–99)
HCT VFR BLD CALC: 34.7 % — SIGNIFICANT CHANGE UP (ref 34.5–45)
HGB BLD-MCNC: 11.3 G/DL — LOW (ref 11.5–15.5)
MCHC RBC-ENTMCNC: 30.6 PG — SIGNIFICANT CHANGE UP (ref 27–34)
MCHC RBC-ENTMCNC: 32.6 GM/DL — SIGNIFICANT CHANGE UP (ref 32–36)
MCV RBC AUTO: 94 FL — SIGNIFICANT CHANGE UP (ref 80–100)
NRBC # BLD: 0 /100 WBCS — SIGNIFICANT CHANGE UP (ref 0–0)
PLATELET # BLD AUTO: 155 K/UL — SIGNIFICANT CHANGE UP (ref 150–400)
POTASSIUM SERPL-MCNC: 3.9 MMOL/L — SIGNIFICANT CHANGE UP (ref 3.5–5.3)
POTASSIUM SERPL-SCNC: 3.9 MMOL/L — SIGNIFICANT CHANGE UP (ref 3.5–5.3)
RBC # BLD: 3.69 M/UL — LOW (ref 3.8–5.2)
RBC # FLD: 13.5 % — SIGNIFICANT CHANGE UP (ref 10.3–14.5)
SODIUM SERPL-SCNC: 148 MMOL/L — HIGH (ref 135–145)
WBC # BLD: 7.35 K/UL — SIGNIFICANT CHANGE UP (ref 3.8–10.5)
WBC # FLD AUTO: 7.35 K/UL — SIGNIFICANT CHANGE UP (ref 3.8–10.5)

## 2020-05-01 PROCEDURE — 99233 SBSQ HOSP IP/OBS HIGH 50: CPT | Mod: CS

## 2020-05-01 RX ORDER — SODIUM CHLORIDE 9 MG/ML
1000 INJECTION, SOLUTION INTRAVENOUS
Refills: 0 | Status: DISCONTINUED | OUTPATIENT
Start: 2020-05-01 | End: 2020-05-02

## 2020-05-01 RX ADMIN — CEFTRIAXONE 100 MILLIGRAM(S): 500 INJECTION, POWDER, FOR SOLUTION INTRAMUSCULAR; INTRAVENOUS at 12:17

## 2020-05-01 RX ADMIN — ENOXAPARIN SODIUM 30 MILLIGRAM(S): 100 INJECTION SUBCUTANEOUS at 11:52

## 2020-05-01 RX ADMIN — SODIUM CHLORIDE 60 MILLILITER(S): 9 INJECTION, SOLUTION INTRAVENOUS at 10:07

## 2020-05-01 NOTE — PROGRESS NOTE ADULT - ASSESSMENT
89 F w/ PMH of HTN, dementia, presented after fall.  Found to have UTI, started on Rocephin.  Cr noted elevated at 1.6.      ·  Problem: Acute cystitis without hematuria.  Plan: Continue Rocephin, f/u cultures.   ·  Problem: Dehydration / WOOD / Hypernatremia - Cr 1.6, baseline ~ 1.2.  Escalate IVF.  Repeat BMP in am.  ·  Problem: Dementia associated with other underlying disease without behavioral disturbance.   ·  Problem: Fall, initial encounter.  Plan: physical therapy eval.   ·  Problem: Hypertension, unspecified type.  Plan: continue home meds.     # DVT Prophylaxis - Lovenox subcut  hopeful placement in [facility].  D/w pt's daughter at [phone] 4/30/20

## 2020-05-01 NOTE — PROGRESS NOTE ADULT - SUBJECTIVE AND OBJECTIVE BOX
Patient: GABRIEL CASTRO [id number] 89y Female                           Internal Medicine Hospitalist Progress Note    Pt remains on IVF.  Poor po intake per RN.  She dnies complaints.  No fevers noted.     ____________________PHYSICAL EXAM:  GENERAL:  NAD Alert, confused.   HEENT: NCAT  CARDIOVASCULAR:  S1, S2  LUNGS: CTAB  ABDOMEN:  soft, (-) tenderness, (-) distension, (+) bowel sounds, (-) guarding, (-) rebound (-) rigidity  EXTREMITIES:  no cyanosis / clubbing / edema.   ____________________    VITALS:  Vital Signs Last 24 Hrs  T(C): 35.8 (01 May 2020 11:14), Max: 37 (2020 18:05)  T(F): 96.5 (01 May 2020 11:14), Max: 98.6 (2020 18:05)  HR: 78 (01 May 2020 11:14) (78 - 102)  BP: 176/80 (01 May 2020 11:14) (134/93 - 176/80)  BP(mean): --  RR: 16 (01 May 2020 11:14) (16 - 18)  SpO2: 98% (01 May 2020 11:14) (94% - 98%) Daily     Daily Weight in k.9 (01 May 2020 05:05)  CAPILLARY BLOOD GLUCOSE        I&O's Summary    2020 07:  -  01 May 2020 07:00  --------------------------------------------------------  IN: 270 mL / OUT: 0 mL / NET: 270 mL    01 May 2020 07:  -  01 May 2020 15:14  --------------------------------------------------------  IN: 240 mL / OUT: 0 mL / NET: 240 mL        LABS:                        11.3   7.35  )-----------( 155      ( 01 May 2020 07:01 )             34.7     05-    148<H>  |  113<H>  |  32<H>  ----------------------------<  97  3.9   |  28  |  1.43<H>    Ca    8.6      01 May 2020 07:01                  Culture - Urine (collected 2020 14:36)  Source: .Urine Clean Catch (Midstream)  Preliminary Report (01 May 2020 10:52):    >100,000 CFU/ml Gram Negative Rods Identification and susceptibility to    follow.        MEDICATIONS:  acetaminophen   Tablet .. 650 milliGRAM(s) Oral every 6 hours PRN  cefTRIAXone   IVPB 1000 milliGRAM(s) IV Intermittent every 24 hours  enoxaparin Injectable 30 milliGRAM(s) SubCutaneous daily  sodium chloride 0.45%. 1000 milliLiter(s) IV Continuous <Continuous>

## 2020-05-02 LAB
-  AMIKACIN: SIGNIFICANT CHANGE UP
-  AMIKACIN: SIGNIFICANT CHANGE UP
-  AMPICILLIN/SULBACTAM: SIGNIFICANT CHANGE UP
-  AMPICILLIN/SULBACTAM: SIGNIFICANT CHANGE UP
-  AMPICILLIN: SIGNIFICANT CHANGE UP
-  AMPICILLIN: SIGNIFICANT CHANGE UP
-  AZTREONAM: SIGNIFICANT CHANGE UP
-  AZTREONAM: SIGNIFICANT CHANGE UP
-  CEFAZOLIN: SIGNIFICANT CHANGE UP
-  CEFAZOLIN: SIGNIFICANT CHANGE UP
-  CEFEPIME: SIGNIFICANT CHANGE UP
-  CEFEPIME: SIGNIFICANT CHANGE UP
-  CEFOXITIN: SIGNIFICANT CHANGE UP
-  CEFOXITIN: SIGNIFICANT CHANGE UP
-  CEFTRIAXONE: SIGNIFICANT CHANGE UP
-  CEFTRIAXONE: SIGNIFICANT CHANGE UP
-  CIPROFLOXACIN: SIGNIFICANT CHANGE UP
-  CIPROFLOXACIN: SIGNIFICANT CHANGE UP
-  GENTAMICIN: SIGNIFICANT CHANGE UP
-  GENTAMICIN: SIGNIFICANT CHANGE UP
-  IMIPENEM: SIGNIFICANT CHANGE UP
-  IMIPENEM: SIGNIFICANT CHANGE UP
-  LEVOFLOXACIN: SIGNIFICANT CHANGE UP
-  LEVOFLOXACIN: SIGNIFICANT CHANGE UP
-  MEROPENEM: SIGNIFICANT CHANGE UP
-  MEROPENEM: SIGNIFICANT CHANGE UP
-  NITROFURANTOIN: SIGNIFICANT CHANGE UP
-  NITROFURANTOIN: SIGNIFICANT CHANGE UP
-  PIPERACILLIN/TAZOBACTAM: SIGNIFICANT CHANGE UP
-  PIPERACILLIN/TAZOBACTAM: SIGNIFICANT CHANGE UP
-  TIGECYCLINE: SIGNIFICANT CHANGE UP
-  TIGECYCLINE: SIGNIFICANT CHANGE UP
-  TOBRAMYCIN: SIGNIFICANT CHANGE UP
-  TOBRAMYCIN: SIGNIFICANT CHANGE UP
-  TRIMETHOPRIM/SULFAMETHOXAZOLE: SIGNIFICANT CHANGE UP
-  TRIMETHOPRIM/SULFAMETHOXAZOLE: SIGNIFICANT CHANGE UP
ANION GAP SERPL CALC-SCNC: 8 MMOL/L — SIGNIFICANT CHANGE UP (ref 5–17)
BUN SERPL-MCNC: 33 MG/DL — HIGH (ref 7–23)
CALCIUM SERPL-MCNC: 8.4 MG/DL — LOW (ref 8.5–10.1)
CHLORIDE SERPL-SCNC: 107 MMOL/L — SIGNIFICANT CHANGE UP (ref 96–108)
CO2 SERPL-SCNC: 25 MMOL/L — SIGNIFICANT CHANGE UP (ref 22–31)
CREAT SERPL-MCNC: 1.26 MG/DL — SIGNIFICANT CHANGE UP (ref 0.5–1.3)
CULTURE RESULTS: SIGNIFICANT CHANGE UP
GLUCOSE SERPL-MCNC: 90 MG/DL — SIGNIFICANT CHANGE UP (ref 70–99)
METHOD TYPE: SIGNIFICANT CHANGE UP
METHOD TYPE: SIGNIFICANT CHANGE UP
ORGANISM # SPEC MICROSCOPIC CNT: SIGNIFICANT CHANGE UP
POTASSIUM SERPL-MCNC: 3.7 MMOL/L — SIGNIFICANT CHANGE UP (ref 3.5–5.3)
POTASSIUM SERPL-SCNC: 3.7 MMOL/L — SIGNIFICANT CHANGE UP (ref 3.5–5.3)
SODIUM SERPL-SCNC: 140 MMOL/L — SIGNIFICANT CHANGE UP (ref 135–145)
SPECIMEN SOURCE: SIGNIFICANT CHANGE UP

## 2020-05-02 PROCEDURE — 99232 SBSQ HOSP IP/OBS MODERATE 35: CPT

## 2020-05-02 RX ORDER — AMLODIPINE BESYLATE 2.5 MG/1
5 TABLET ORAL DAILY
Refills: 0 | Status: DISCONTINUED | OUTPATIENT
Start: 2020-05-02 | End: 2020-05-04

## 2020-05-02 RX ADMIN — Medication 1 MILLIGRAM(S): at 16:28

## 2020-05-02 RX ADMIN — Medication 650 MILLIGRAM(S): at 06:06

## 2020-05-02 RX ADMIN — CEFTRIAXONE 100 MILLIGRAM(S): 500 INJECTION, POWDER, FOR SOLUTION INTRAMUSCULAR; INTRAVENOUS at 12:58

## 2020-05-02 NOTE — PROGRESS NOTE ADULT - SUBJECTIVE AND OBJECTIVE BOX
Patient: GABRIEL CASTRO [id number] 89y Female                           Internal Medicine Hospitalist Progress Note    Pt remains on IVF.  Appears to be more alert.  denies complaints.  No fevers noted.     ____________________PHYSICAL EXAM:  GENERAL:  NAD Alert, confused.   HEENT: NCAT  CARDIOVASCULAR:  S1, S2  LUNGS: CTAB  ABDOMEN:  soft, (-) tenderness, (-) distension, (+) bowel sounds, (-) guarding, (-) rebound (-) rigidity  EXTREMITIES:  no cyanosis / clubbing / edema.   ____________________    VITALS:  Vital Signs Last 24 Hrs  T(C): 36.9 (02 May 2020 11:52), Max: 37.4 (02 May 2020 05:14)  T(F): 98.4 (02 May 2020 11:52), Max: 99.3 (02 May 2020 05:14)  HR: 80 (02 May 2020 11:52) (72 - 86)  BP: 159/93 (02 May 2020 11:52) (144/89 - 159/93)  BP(mean): --  RR: 17 (02 May 2020 11:52) (17 - 18)  SpO2: 98% (02 May 2020 11:52) (95% - 99%) Daily     Daily   CAPILLARY BLOOD GLUCOSE        I&O's Summary    01 May 2020 07:01  -  02 May 2020 07:00  --------------------------------------------------------  IN: 240 mL / OUT: 400 mL / NET: -160 mL        LABS:                        11.3   7.35  )-----------( 155      ( 01 May 2020 07:01 )             34.7     05-02    140  |  107  |  33<H>  ----------------------------<  90  3.7   |  25  |  1.26    Ca    8.4<L>      02 May 2020 08:38                    MEDICATIONS:  acetaminophen   Tablet .. 650 milliGRAM(s) Oral every 6 hours PRN  cefTRIAXone   IVPB 1000 milliGRAM(s) IV Intermittent every 24 hours  enoxaparin Injectable 30 milliGRAM(s) SubCutaneous daily

## 2020-05-02 NOTE — PROGRESS NOTE ADULT - ASSESSMENT
89 F w/ PMH of HTN, dementia, presented after fall.  Found to have UTI, started on Rocephin.  Cr noted elevated at 1.6.      ·  Problem: Acute cystitis without hematuria.  Plan: Continue Rocephin, f/u cultures.   ·  Problem: Dehydration / WOOD / Hypernatremia - Cr 1.6, baseline ~ 1.2.  Cr improving.  Pt more alert.  D/c IVF.   ·  Problem: Dementia associated with other underlying disease without behavioral disturbance, Acute Metabolic Encephalopathy - improved.     ·  Problem: Fall, initial encounter.  Plan: physical therapy eval.  Likely [facility] placement.   ·  Problem: Hypertension, unspecified type.  Plan: add Norvasc    # DVT Prophylaxis - Lovenox subcut 89 F w/ PMH of HTN, dementia, presented after fall.  Found to have UTI, started on Rocephin.  Cr noted elevated at 1.6.      ·  Problem: Acute cystitis without hematuria.  Plan: Continue Rocephin, f/u cultures.   ·  Problem: Dehydration / WOOD / Hypernatremia - Cr 1.6, baseline ~ 1.2.  Cr improving.  Pt more alert.  D/c IVF.   ·  Problem: Dementia associated with other underlying disease without behavioral disturbance, Acute Metabolic Encephalopathy - improved.     ·  Problem: Fall, initial encounter.  Plan: physical therapy eval.  Likely [facility] placement.   ·  Problem: Hypertension, unspecified type.  Plan: add Norvasc    # DVT Prophylaxis - Lovenox subcut    Plan of care discussed with Daughter Tho. 89 F w/ PMH of HTN, dementia, presented after fall.  Found to have UTI, started on Rocephin.  Cr noted elevated at 1.6.      ·  Problem: Acute cystitis without hematuria.  Plan: Continue Rocephin, f/u cultures.   ·  Problem: Dehydration / WOOD / Hypernatremia - Cr 1.6, baseline ~ 1.2.  Cr improving.  Pt more alert.  D/c IVF.   ·  Problem: Dementia associated with other underlying disease without behavioral disturbance, Acute Metabolic Encephalopathy - improved.     ·  Problem: Fall, initial encounter.  Plan: physical therapy eval.  Likely [facility] placement.   ·  Problem: Hypertension, unspecified type.  Plan: add Norvasc    # DVT Prophylaxis - Lovenox subcut    Plan of care discussed with Daughter Tho.  	  Agrees with [facility] placement when bed available.

## 2020-05-03 LAB
ANION GAP SERPL CALC-SCNC: 7 MMOL/L — SIGNIFICANT CHANGE UP (ref 5–17)
BUN SERPL-MCNC: 28 MG/DL — HIGH (ref 7–23)
CALCIUM SERPL-MCNC: 8.8 MG/DL — SIGNIFICANT CHANGE UP (ref 8.5–10.1)
CHLORIDE SERPL-SCNC: 109 MMOL/L — HIGH (ref 96–108)
CO2 SERPL-SCNC: 25 MMOL/L — SIGNIFICANT CHANGE UP (ref 22–31)
CREAT SERPL-MCNC: 1.07 MG/DL — SIGNIFICANT CHANGE UP (ref 0.5–1.3)
GLUCOSE SERPL-MCNC: 79 MG/DL — SIGNIFICANT CHANGE UP (ref 70–99)
HCT VFR BLD CALC: 36.1 % — SIGNIFICANT CHANGE UP (ref 34.5–45)
HGB BLD-MCNC: 11.3 G/DL — LOW (ref 11.5–15.5)
MCHC RBC-ENTMCNC: 30 PG — SIGNIFICANT CHANGE UP (ref 27–34)
MCHC RBC-ENTMCNC: 31.3 GM/DL — LOW (ref 32–36)
MCV RBC AUTO: 95.8 FL — SIGNIFICANT CHANGE UP (ref 80–100)
NRBC # BLD: 0 /100 WBCS — SIGNIFICANT CHANGE UP (ref 0–0)
PLATELET # BLD AUTO: 155 K/UL — SIGNIFICANT CHANGE UP (ref 150–400)
POTASSIUM SERPL-MCNC: 3.9 MMOL/L — SIGNIFICANT CHANGE UP (ref 3.5–5.3)
POTASSIUM SERPL-SCNC: 3.9 MMOL/L — SIGNIFICANT CHANGE UP (ref 3.5–5.3)
RBC # BLD: 3.77 M/UL — LOW (ref 3.8–5.2)
RBC # FLD: 13 % — SIGNIFICANT CHANGE UP (ref 10.3–14.5)
SODIUM SERPL-SCNC: 141 MMOL/L — SIGNIFICANT CHANGE UP (ref 135–145)
WBC # BLD: 6.66 K/UL — SIGNIFICANT CHANGE UP (ref 3.8–10.5)
WBC # FLD AUTO: 6.66 K/UL — SIGNIFICANT CHANGE UP (ref 3.8–10.5)

## 2020-05-03 PROCEDURE — 99232 SBSQ HOSP IP/OBS MODERATE 35: CPT

## 2020-05-03 RX ADMIN — CEFTRIAXONE 100 MILLIGRAM(S): 500 INJECTION, POWDER, FOR SOLUTION INTRAMUSCULAR; INTRAVENOUS at 13:09

## 2020-05-03 RX ADMIN — AMLODIPINE BESYLATE 5 MILLIGRAM(S): 2.5 TABLET ORAL at 05:45

## 2020-05-03 RX ADMIN — ENOXAPARIN SODIUM 30 MILLIGRAM(S): 100 INJECTION SUBCUTANEOUS at 11:36

## 2020-05-03 NOTE — PROGRESS NOTE ADULT - SUBJECTIVE AND OBJECTIVE BOX
Patient: GABRIEL CASTRO [id number] 89y Female                           Internal Medicine Hospitalist Progress Note    Off IVF.  Appears to be more alert.  denies complaints.  No fevers noted.     ____________________PHYSICAL EXAM:  GENERAL:  NAD Alert, confused.   HEENT: NCAT  CARDIOVASCULAR:  S1, S2  LUNGS: CTAB  ABDOMEN:  soft, (-) tenderness, (-) distension, (+) bowel sounds, (-) guarding, (-) rebound (-) rigidity  EXTREMITIES:  no cyanosis / clubbing / edema.   ____________________    VITALS:  Vital Signs Last 24 Hrs  T(C): 36.2 (03 May 2020 12:32), Max: 37 (02 May 2020 17:00)  T(F): 97.2 (03 May 2020 12:32), Max: 98.6 (02 May 2020 17:00)  HR: 80 (03 May 2020 12:32) (79 - 88)  BP: 127/80 (03 May 2020 12:32) (127/80 - 156/90)  BP(mean): --  RR: 18 (03 May 2020 12:32) (17 - 18)  SpO2: 100% (03 May 2020 12:32) (97% - 100%) Daily     Daily   CAPILLARY BLOOD GLUCOSE        I&O's Summary    02 May 2020 07:01  -  03 May 2020 07:00  --------------------------------------------------------  IN: 0 mL / OUT: 1650 mL / NET: -1650 mL    03 May 2020 07:01  -  03 May 2020 13:33  --------------------------------------------------------  IN: 480 mL / OUT: 0 mL / NET: 480 mL        LABS:                        11.3   6.66  )-----------( 155      ( 03 May 2020 07:11 )             36.1     05-03    141  |  109<H>  |  28<H>  ----------------------------<  79  3.9   |  25  |  1.07    Ca    8.8      03 May 2020 07:11                    MEDICATIONS:  acetaminophen   Tablet .. 650 milliGRAM(s) Oral every 6 hours PRN  amLODIPine   Tablet 5 milliGRAM(s) Oral daily  enoxaparin Injectable 30 milliGRAM(s) SubCutaneous daily  LORazepam   Injectable 1 milliGRAM(s) IntraMuscular every 8 hours PRN

## 2020-05-03 NOTE — PROGRESS NOTE ADULT - ASSESSMENT
89 F w/ PMH of HTN, dementia, presented after fall.  Found to have UTI, started on Rocephin.  Cr noted elevated at 1.6.      ·  Problem: Acute cystitis without hematuria.  Plan: Complete Rocephin.  Afebrile, asymptomatic.    ·  Problem: Dehydration / WOOD / Hypernatremia - Cr 1.6, baseline ~ 1.2.  Resolved.  Pt maintaining po intake and hydration off IVF.    ·  Problem: Dementia associated with other underlying disease without behavioral disturbance, Acute Metabolic Encephalopathy - improved.     ·  Problem: Fall, initial encounter.  Plan: physical therapy eval.  Likely [facility] placement.   ·  Problem: Hypertension, unspecified type.  Plan: added Norvasc, BP better controlled.     # DVT Prophylaxis - Lovenox subcut  Plan [facility] in am.

## 2020-05-04 ENCOUNTER — TRANSCRIPTION ENCOUNTER (OUTPATIENT)
Age: 85
End: 2020-05-04

## 2020-05-04 VITALS
HEART RATE: 65 BPM | RESPIRATION RATE: 18 BRPM | SYSTOLIC BLOOD PRESSURE: 114 MMHG | OXYGEN SATURATION: 97 % | DIASTOLIC BLOOD PRESSURE: 66 MMHG | TEMPERATURE: 97 F

## 2020-05-04 LAB
ANION GAP SERPL CALC-SCNC: 8 MMOL/L — SIGNIFICANT CHANGE UP (ref 5–17)
BUN SERPL-MCNC: 26 MG/DL — HIGH (ref 7–23)
CALCIUM SERPL-MCNC: 8.5 MG/DL — SIGNIFICANT CHANGE UP (ref 8.5–10.1)
CHLORIDE SERPL-SCNC: 108 MMOL/L — SIGNIFICANT CHANGE UP (ref 96–108)
CO2 SERPL-SCNC: 25 MMOL/L — SIGNIFICANT CHANGE UP (ref 22–31)
CREAT SERPL-MCNC: 1.11 MG/DL — SIGNIFICANT CHANGE UP (ref 0.5–1.3)
GLUCOSE SERPL-MCNC: 86 MG/DL — SIGNIFICANT CHANGE UP (ref 70–99)
POTASSIUM SERPL-MCNC: 3.7 MMOL/L — SIGNIFICANT CHANGE UP (ref 3.5–5.3)
POTASSIUM SERPL-SCNC: 3.7 MMOL/L — SIGNIFICANT CHANGE UP (ref 3.5–5.3)
SODIUM SERPL-SCNC: 141 MMOL/L — SIGNIFICANT CHANGE UP (ref 135–145)

## 2020-05-04 PROCEDURE — 99239 HOSP IP/OBS DSCHRG MGMT >30: CPT

## 2020-05-04 RX ORDER — HYDROCHLOROTHIAZIDE 25 MG
12.5 TABLET ORAL DAILY
Refills: 0 | Status: DISCONTINUED | OUTPATIENT
Start: 2020-05-04 | End: 2020-05-04

## 2020-05-04 RX ORDER — ERGOCALCIFEROL 1.25 MG/1
0 CAPSULE ORAL
Qty: 0 | Refills: 0 | DISCHARGE

## 2020-05-04 RX ORDER — PREGABALIN 225 MG/1
1 CAPSULE ORAL
Qty: 0 | Refills: 0 | DISCHARGE

## 2020-05-04 RX ORDER — HYDROXYZINE HCL 10 MG
10 TABLET ORAL
Qty: 0 | Refills: 0 | DISCHARGE

## 2020-05-04 RX ORDER — MEMANTINE HYDROCHLORIDE 10 MG/1
10 TABLET, FILM COATED ORAL TWICE DAILY
Qty: 180 | Refills: 3 | Status: ACTIVE | COMMUNITY
Start: 1900-01-01 | End: 1900-01-01

## 2020-05-04 RX ORDER — RISPERIDONE 4 MG/1
1 TABLET ORAL
Qty: 0 | Refills: 0 | DISCHARGE

## 2020-05-04 RX ADMIN — AMLODIPINE BESYLATE 5 MILLIGRAM(S): 2.5 TABLET ORAL at 05:19

## 2020-05-04 RX ADMIN — Medication 1 MILLIGRAM(S): at 11:49

## 2020-05-04 RX ADMIN — ENOXAPARIN SODIUM 30 MILLIGRAM(S): 100 INJECTION SUBCUTANEOUS at 12:57

## 2020-05-04 NOTE — DISCHARGE NOTE PROVIDER - NSDCCPCAREPLAN_GEN_ALL_CORE_FT
PRINCIPAL DISCHARGE DIAGNOSIS  Diagnosis: Dehydration  Assessment and Plan of Treatment:       SECONDARY DISCHARGE DIAGNOSES  Diagnosis: Acute kidney injury  Assessment and Plan of Treatment:     Diagnosis: Dementia associated with other underlying disease without behavioral disturbance  Assessment and Plan of Treatment:     Diagnosis: Fall, initial encounter  Assessment and Plan of Treatment:

## 2020-05-04 NOTE — PROGRESS NOTE ADULT - SUBJECTIVE AND OBJECTIVE BOX
Patient: GABRIEL CASTRO [id number] 89y Female                           Internal Medicine Hospitalist Progress Note    Off IVF.  Ate most of breakfast.  Alert, confused.  denies complaints.  No fevers noted.     ____________________PHYSICAL EXAM:  GENERAL:  NAD Alert, confused.   HEENT: NCAT  CARDIOVASCULAR:  S1, S2  LUNGS: CTAB  ABDOMEN:  soft, (-) tenderness, (-) distension, (+) bowel sounds, (-) guarding, (-) rebound (-) rigidity  EXTREMITIES:  no cyanosis / clubbing / edema.   ____________________    VITALS:  Vital Signs Last 24 Hrs  T(C): 36.4 (04 May 2020 05:28), Max: 36.7 (03 May 2020 17:02)  T(F): 97.5 (04 May 2020 05:28), Max: 98.1 (03 May 2020 17:02)  HR: 74 (04 May 2020 05:28) (74 - 87)  BP: 162/94 (04 May 2020 05:28) (127/80 - 162/94)  BP(mean): --  RR: 18 (04 May 2020 05:28) (17 - 18)  SpO2: 99% (04 May 2020 05:28) (98% - 100%) Daily     Daily   CAPILLARY BLOOD GLUCOSE        I&O's Summary    03 May 2020 07:01  -  04 May 2020 07:00  --------------------------------------------------------  IN: 480 mL / OUT: 800 mL / NET: -320 mL        LABS:                        11.3   6.66  )-----------( 155      ( 03 May 2020 07:11 )             36.1     05-04    141  |  108  |  26<H>  ----------------------------<  86  3.7   |  25  |  1.11    Ca    8.5      04 May 2020 07:23                    MEDICATIONS:  acetaminophen   Tablet .. 650 milliGRAM(s) Oral every 6 hours PRN  amLODIPine   Tablet 5 milliGRAM(s) Oral daily  enoxaparin Injectable 30 milliGRAM(s) SubCutaneous daily  hydrochlorothiazide 12.5 milliGRAM(s) Oral daily  LORazepam   Injectable 1 milliGRAM(s) IntraMuscular every 8 hours PRN

## 2020-05-04 NOTE — DISCHARGE NOTE PROVIDER - NSDCMRMEDTOKEN_GEN_ALL_CORE_FT
amLODIPine 5 mg oral tablet: 1 tab(s) orally once a day  aspirin 81 mg oral delayed release tablet: 1 tab(s) orally once a day  hydroCHLOROthiazide 12.5 mg oral tablet: 1 tab(s) orally once a day  lisinopril 20 mg oral tablet: 1 tab(s) orally once a day  memantine 10 mg oral tablet: 1 tab(s) orally every 12 hours  oxybutynin 10 mg/24 hr oral tablet, extended release: 1 tab(s) orally once a day  RisperDAL 0.5 mg oral tablet: 1 tab(s) orally 2 times a day as needed severe agitation.

## 2020-05-04 NOTE — DISCHARGE NOTE PROVIDER - HOSPITAL COURSE
89 F w/ PMH of HTN, dementia, presented after fall.  Found to have UTI, started on Rocephin.  Cr noted elevated at 1.6.  Given IVF with resolution.  Treated for UTI.  Symptoms have resolved.  Seen by PT, recommending [facility].          Disposition: Stable for discharge.  Outpatient followup discussed.    Total time spent on discharge is  35  minutes.

## 2020-05-04 NOTE — PROGRESS NOTE ADULT - ASSESSMENT
89 F w/ PMH of HTN, dementia, presented after fall.  Found to have UTI, started on Rocephin.  Cr noted elevated at 1.6.      ·  Problem: Acute cystitis without hematuria.  Plan: Complete Rocephin.  Afebrile, asymptomatic.    ·  Problem: Dehydration / WOOD / Hypernatremia - Cr 1.6, baseline ~ 1.2.  Resolved.  Pt maintaining po intake and hydration off IVF.    ·  Problem: Dementia associated with other underlying disease without behavioral disturbance, Acute Metabolic Encephalopathy - improved.     ·  Problem: Fall, initial encounter.  Plan: physical therapy eval.  Likely [facility] placement.   ·  Problem: Hypertension, unspecified type.  Plan: Add HCTZ.     # DVT Prophylaxis - Lovenox subcut  Stable for [facility]

## 2020-05-04 NOTE — DISCHARGE NOTE NURSING/CASE MANAGEMENT/SOCIAL WORK - PATIENT PORTAL LINK FT
You can access the FollowMyHealth Patient Portal offered by [facility] by registering at the following website: [email or web address]. By joining [employer]’s FollowMyHealth portal, you will also be able to view your health information using other applications (apps) compatible with our system.

## 2020-05-06 DIAGNOSIS — Z90.49 ACQUIRED ABSENCE OF OTHER SPECIFIED PARTS OF DIGESTIVE TRACT: ICD-10-CM

## 2020-05-06 DIAGNOSIS — I10 ESSENTIAL (PRIMARY) HYPERTENSION: ICD-10-CM

## 2020-05-06 DIAGNOSIS — F03.90 UNSPECIFIED DEMENTIA WITHOUT BEHAVIORAL DISTURBANCE: ICD-10-CM

## 2020-05-06 DIAGNOSIS — Z85.3 PERSONAL HISTORY OF MALIGNANT NEOPLASM OF BREAST: ICD-10-CM

## 2020-05-06 DIAGNOSIS — Z88.6 ALLERGY STATUS TO ANALGESIC AGENT: ICD-10-CM
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
## 2020-05-06 DIAGNOSIS — N17.9 ACUTE KIDNEY FAILURE, UNSPECIFIED: ICD-10-CM
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
## 2020-05-06 DIAGNOSIS — B96.4 PROTEUS (MIRABILIS) (MORGANII) AS THE CAUSE OF DISEASES CLASSIFIED ELSEWHERE: ICD-10-CM

## 2020-05-06 DIAGNOSIS — G93.41 METABOLIC ENCEPHALOPATHY: ICD-10-CM

## 2020-05-06 DIAGNOSIS — Z90.12 ACQUIRED ABSENCE OF LEFT BREAST AND NIPPLE: ICD-10-CM

## 2020-05-06 DIAGNOSIS — E86.0 DEHYDRATION: ICD-10-CM

## 2020-05-06 DIAGNOSIS — E87.0 HYPEROSMOLALITY AND HYPERNATREMIA: ICD-10-CM

## 2020-05-06 DIAGNOSIS — Z79.82 LONG TERM (CURRENT) USE OF ASPIRIN: ICD-10-CM

## 2020-05-06 DIAGNOSIS — N30.00 ACUTE CYSTITIS WITHOUT HEMATURIA: ICD-10-CM

## 2020-05-06 DIAGNOSIS — B96.20 UNSPECIFIED ESCHERICHIA COLI [E. COLI] AS THE CAUSE OF DISEASES CLASSIFIED ELSEWHERE: ICD-10-CM

## 2020-05-24 ENCOUNTER — RX RENEWAL (OUTPATIENT)
Age: 85
End: 2020-05-24

## 2020-05-24 RX ORDER — LISINOPRIL 20 MG/1
20 TABLET ORAL DAILY
Qty: 45 | Refills: 0 | Status: ACTIVE | COMMUNITY
Start: 2020-05-24 | End: 1900-01-01

## 2022-04-13 NOTE — PATIENT PROFILE ADULT - DISASTER - NSASFALLWHENOCCURRED_GEN_A_NUR
Internal medicine note      Patient continues to have ongoing worsening lumbar pain.  No radicular symptoms noted this for was extension or radiation toward the legs.  Recent x-rays and December and April noted addended to this in facet changes at L3-L5    Will make arrangements for MRI the lumbar spine and  referred to the back/ spine clinic    Will attempt a trial of methocarbamol 500 mg b.i.d. in the total length 100 mg b.i.d. with meals     last six months

## 2022-09-20 NOTE — DISCHARGE NOTE ADULT - THE PATIENT HAS
Severe; acute on chronic 
difficulty decision making/difficulty remembering/difficulty concentrating

## 2023-09-19 NOTE — ED PROVIDER NOTE - INPATIENT RESIDENT/ACP NOTIFIED
lent Consent (Lip)/Introductory Paragraph: The rationale for Mohs was explained to the patient and consent was obtained. The risks, benefits and alternatives to therapy were discussed in detail. Specifically, the risks of lip deformity, changes in the oral aperture, infection, scarring, bleeding, prolonged wound healing, incomplete removal, allergy to anesthesia, nerve injury and recurrence were addressed. Prior to the procedure, the treatment site was clearly identified and confirmed by the patient. All components of Universal Protocol/PAUSE Rule completed.

## 2024-01-03 NOTE — PROGRESS NOTE ADULT - PROBLEM SELECTOR PLAN 1
Orthopedic consult :already seen per consult note -non-surgical fracture will treat with PT, rehab, pain management  Physical therapy consult  MRI noted  Needs 3night stay before rehab   Pain Management
Orthopedic consult :already seen per consult note -non-surgical fracture will treat with PT, rehab, pain management  Physical therapy consult  MRI noted  Needs 3night stay before rehab   Pain Management
03-Jan-2024 13:11

## 2024-07-10 NOTE — ED ADULT NURSE NOTE - DOES PATIENT HAVE ADVANCE DIRECTIVE
1254 - Attending aware of discharge summary pending.  Woody ROSALES is anticipating SOC Friday or Saturday pending PCP approval.      Kristan Presley, VIKKIN, RN  [facility] Care Management   No

## 2024-09-06 NOTE — ED PROVIDER NOTE - RESPIRATORY, MLM
From: Gianna Kaur  To: Gage Curry  Sent: 9/5/2024 8:02 AM CDT  Subject: Blood test     Dr. Curry    I need to do the attached blood test for the cosmetic surgery that I will have by the end of November . Due my unexpected surgery on April I didn’t have the surgery on July. If you can please order me the blood test so I can go in to get them done. Thank you.    Breath sounds clear and equal bilaterally.

## 2025-02-20 NOTE — PATIENT PROFILE ADULT - DISASTER - PRO INTERPRETER NEED 2
February 20, 2025    To Whom It May Concern:         This is confirmation that Aminata Lorenzana attended her scheduled appointment with Sophia Villalpando M.D. on 2/20/25. Please excuse absence as she is recovering from an acute illness. She is expected to be able to return work on 2/24/2025.         If you have any questions please do not hesitate to call me at the phone number listed below.    Sincerely,  Sophia Villalpando M.D.  (Electronically signed)  [phone]                 English

## 2025-04-23 NOTE — ED ADULT NURSE NOTE - NURSING MUSC EXTREMITY LIMITED ROM
Department of Dermatology        Guidelines for Care of Procedure Sites         Gently clean the area once a day using bar soap.    Gently pat the site dry, and apply sterile Vaseline packet provided by the Dermatology Department.    Cover the area with a new band-aid or other bandage daily for 5 days, or as needed.    4. Your biopsy will take about a week to be resulted.  We send you a letter in the mail if it comes back \"normal.\" we prefer to call you and discuss any unusual findings.  If you have not received your result within two weeks from your biopsy date, please call us at [phone].  Tell the person answering the phone that you are following up on a biopsy and need results.      After 5:00 pm please call the [email or web address] Answering Service at [phone] if you have an urgent need to speak with a Dermatologist.     If you have questions please call Dept: [phone].        
right lower extremity
